# Patient Record
Sex: FEMALE | Race: WHITE
[De-identification: names, ages, dates, MRNs, and addresses within clinical notes are randomized per-mention and may not be internally consistent; named-entity substitution may affect disease eponyms.]

---

## 2020-10-10 ENCOUNTER — TRANSCRIPTION ENCOUNTER (OUTPATIENT)
Age: 35
End: 2020-10-10

## 2020-12-05 ENCOUNTER — TRANSCRIPTION ENCOUNTER (OUTPATIENT)
Age: 35
End: 2020-12-05

## 2021-02-02 ENCOUNTER — OUTPATIENT (OUTPATIENT)
Dept: OUTPATIENT SERVICES | Facility: HOSPITAL | Age: 36
LOS: 1 days | Discharge: HOME | End: 2021-02-02

## 2021-02-02 VITALS
SYSTOLIC BLOOD PRESSURE: 120 MMHG | DIASTOLIC BLOOD PRESSURE: 90 MMHG | TEMPERATURE: 98 F | RESPIRATION RATE: 18 BRPM | HEART RATE: 102 BPM

## 2021-02-02 LAB
ALBUMIN SERPL ELPH-MCNC: 3.5 G/DL — SIGNIFICANT CHANGE UP (ref 3.5–5.2)
ALP SERPL-CCNC: 92 U/L — SIGNIFICANT CHANGE UP (ref 30–115)
ALT FLD-CCNC: 16 U/L — SIGNIFICANT CHANGE UP (ref 0–41)
ANION GAP SERPL CALC-SCNC: 13 MMOL/L — SIGNIFICANT CHANGE UP (ref 7–14)
APPEARANCE UR: ABNORMAL
AST SERPL-CCNC: 18 U/L — SIGNIFICANT CHANGE UP (ref 0–41)
BACTERIA # UR AUTO: NEGATIVE — SIGNIFICANT CHANGE UP
BASOPHILS # BLD AUTO: 0.03 K/UL — SIGNIFICANT CHANGE UP (ref 0–0.2)
BASOPHILS NFR BLD AUTO: 0.2 % — SIGNIFICANT CHANGE UP (ref 0–1)
BILIRUB SERPL-MCNC: <0.2 MG/DL — SIGNIFICANT CHANGE UP (ref 0.2–1.2)
BILIRUB UR-MCNC: NEGATIVE — SIGNIFICANT CHANGE UP
BUN SERPL-MCNC: 7 MG/DL — LOW (ref 10–20)
CALCIUM SERPL-MCNC: 8.9 MG/DL — SIGNIFICANT CHANGE UP (ref 8.5–10.1)
CHLORIDE SERPL-SCNC: 101 MMOL/L — SIGNIFICANT CHANGE UP (ref 98–110)
CO2 SERPL-SCNC: 22 MMOL/L — SIGNIFICANT CHANGE UP (ref 17–32)
COLOR SPEC: YELLOW — SIGNIFICANT CHANGE UP
CREAT SERPL-MCNC: 0.6 MG/DL — LOW (ref 0.7–1.5)
DIFF PNL FLD: NEGATIVE — SIGNIFICANT CHANGE UP
EOSINOPHIL # BLD AUTO: 0.23 K/UL — SIGNIFICANT CHANGE UP (ref 0–0.7)
EOSINOPHIL NFR BLD AUTO: 1.9 % — SIGNIFICANT CHANGE UP (ref 0–8)
EPI CELLS # UR: >27 /HPF — HIGH (ref 0–5)
GLUCOSE SERPL-MCNC: 71 MG/DL — SIGNIFICANT CHANGE UP (ref 70–99)
GLUCOSE UR QL: NEGATIVE — SIGNIFICANT CHANGE UP
HCT VFR BLD CALC: 31.9 % — LOW (ref 37–47)
HGB BLD-MCNC: 10.7 G/DL — LOW (ref 12–16)
HYALINE CASTS # UR AUTO: 83 /LPF — HIGH (ref 0–7)
IMM GRANULOCYTES NFR BLD AUTO: 1.3 % — HIGH (ref 0.1–0.3)
KETONES UR-MCNC: ABNORMAL
LDH SERPL L TO P-CCNC: 159 — SIGNIFICANT CHANGE UP (ref 50–242)
LEUKOCYTE ESTERASE UR-ACNC: NEGATIVE — SIGNIFICANT CHANGE UP
LYMPHOCYTES # BLD AUTO: 16.6 % — LOW (ref 20.5–51.1)
LYMPHOCYTES # BLD AUTO: 2 K/UL — SIGNIFICANT CHANGE UP (ref 1.2–3.4)
MCHC RBC-ENTMCNC: 28.8 PG — SIGNIFICANT CHANGE UP (ref 27–31)
MCHC RBC-ENTMCNC: 33.5 G/DL — SIGNIFICANT CHANGE UP (ref 32–37)
MCV RBC AUTO: 86 FL — SIGNIFICANT CHANGE UP (ref 81–99)
MONOCYTES # BLD AUTO: 0.61 K/UL — HIGH (ref 0.1–0.6)
MONOCYTES NFR BLD AUTO: 5.1 % — SIGNIFICANT CHANGE UP (ref 1.7–9.3)
NEUTROPHILS # BLD AUTO: 8.99 K/UL — HIGH (ref 1.4–6.5)
NEUTROPHILS NFR BLD AUTO: 74.9 % — SIGNIFICANT CHANGE UP (ref 42.2–75.2)
NITRITE UR-MCNC: NEGATIVE — SIGNIFICANT CHANGE UP
NRBC # BLD: 0 /100 WBCS — SIGNIFICANT CHANGE UP (ref 0–0)
PH UR: 6 — SIGNIFICANT CHANGE UP (ref 5–8)
PLATELET # BLD AUTO: 186 K/UL — SIGNIFICANT CHANGE UP (ref 130–400)
POTASSIUM SERPL-MCNC: 3.8 MMOL/L — SIGNIFICANT CHANGE UP (ref 3.5–5)
POTASSIUM SERPL-SCNC: 3.8 MMOL/L — SIGNIFICANT CHANGE UP (ref 3.5–5)
PROT SERPL-MCNC: 6 G/DL — SIGNIFICANT CHANGE UP (ref 6–8)
PROT UR-MCNC: ABNORMAL
RBC # BLD: 3.71 M/UL — LOW (ref 4.2–5.4)
RBC # FLD: 14.1 % — SIGNIFICANT CHANGE UP (ref 11.5–14.5)
RBC CASTS # UR COMP ASSIST: 9 /HPF — HIGH (ref 0–4)
SODIUM SERPL-SCNC: 136 MMOL/L — SIGNIFICANT CHANGE UP (ref 135–146)
SP GR SPEC: 1.03 — SIGNIFICANT CHANGE UP (ref 1.01–1.03)
URATE SERPL-MCNC: 5.4 MG/DL — SIGNIFICANT CHANGE UP (ref 2.5–7)
UROBILINOGEN FLD QL: SIGNIFICANT CHANGE UP
WBC # BLD: 12.02 K/UL — HIGH (ref 4.8–10.8)
WBC # FLD AUTO: 12.02 K/UL — HIGH (ref 4.8–10.8)
WBC UR QL: 10 /HPF — HIGH (ref 0–5)

## 2021-02-02 RX ORDER — LABETALOL HCL 100 MG
100 TABLET ORAL ONCE
Refills: 0 | Status: COMPLETED | OUTPATIENT
Start: 2021-02-02 | End: 2021-02-02

## 2021-02-02 RX ADMIN — Medication 100 MILLIGRAM(S): at 23:33

## 2021-02-02 RX ADMIN — Medication 100 MILLIGRAM(S): at 22:30

## 2021-02-02 NOTE — OB PROVIDER TRIAGE NOTE - ADDITIONAL INSTRUCTIONS
If you have headache, blurry vision, chest pain, difficulty breathing, or severe abdominal pain, call the doctor or return to the hospital. Follow up with your OBGYN at next scheduled appointment on 2/3, increase dose to labetalol 200mg BID for chronic hypertension.

## 2021-02-02 NOTE — OB PROVIDER TRIAGE NOTE - NSHPPHYSICALEXAM_GEN_ALL_CORE
HR: 100 (02-02-21 @ 17:23) (100 - 100)  BP: 120/90 (02-02-21 @ 17:23) (120/90 - 120/90)    Gen: A+OX3. NAD  Heart: RRR, no M/R/G  Lungs: CTAB  Abd: Soft, Nontender. Gravid. no palpable contractions  LE: no erythema, edema, or pain  Neuro: biceps reflex 1+ bilaterally    FHR: 140bpm/moderate variability/+accelerations/no decelerations  TOCO: irregular  SVE: deferred  Sonogram: cephalic, anterior placenta, MVP: 7.61, BPP: 10/10    EFW by Leopolds: 2500

## 2021-02-02 NOTE — PROGRESS NOTE ADULT - ASSESSMENT
36yo  at 33w0d GA, GBS unknown, with cHTN on labetalol 100mg BID, r/o superimposed preeclampsia, reassuring maternal and fetal status, BPP 10/10  - severe range BPs likely secondary to missing PM dose of labetalol. Dose increased to 200mg BID, first dose given now, BPs now in elevated non-severe range.  - f/u Uprcr  - follow up with MFM at next appt on 2/3 AM  - preeclampsia precautions discussed  -  labor precautions  - discharge home    Dr. Parson and Dr. Jackson aware.

## 2021-02-02 NOTE — OB PROVIDER TRIAGE NOTE - NSOBPROVIDERNOTE_OBGYN_ALL_OB_FT
36yo  at 33w0d GA, GBS unknown, with chronic htn, with elevated blood pressures in the office, BPP: 10/10  -continuous EFM and toco  -vitals [id number] and labs  -IV access  -re evaluate    Dr. Walsh aware, Dr. Jackson to be made aware 36yo  at 33w0d GA, GBS unknown, with chronic htn, with elevated blood pressures in the office, BPP: 10/10  -continuous EFM and toco  -vitals [id number] and labs  -IV access  -re evaluate    Dr. Walsh aware, Dr. Jackson to be made aware    ADDENDUM: See progress note

## 2021-02-02 NOTE — OB PROVIDER TRIAGE NOTE - HISTORY OF PRESENT ILLNESS
32yo  at 33w0d GA, EDC 3/23/21 by 1st trimester sonogram for prolonged monitoring. Patient was seen in the office today and had elevated blood pressures in the 140's/90's 3 times. Patient has a history of chronic hypertension patient states her normal blood pressures are in the 130's/80's. Patient denies headache, vision changes, chest pain, SOB, RUQ or epigastric pain, LE edema. Denies ctx, LOF, VB. Good fetal movement. Last meal 11Am, Last BM 10Am, Last sex months ago. GBS unknown

## 2021-02-03 VITALS — DIASTOLIC BLOOD PRESSURE: 58 MMHG | SYSTOLIC BLOOD PRESSURE: 106 MMHG | HEART RATE: 90 BPM

## 2021-02-03 LAB
CREAT ?TM UR-MCNC: 231 MG/DL — SIGNIFICANT CHANGE UP
PROT ?TM UR-MCNC: 18 MG/DLG/24H — SIGNIFICANT CHANGE UP
PROT/CREAT UR-RTO: 0.1 RATIO — SIGNIFICANT CHANGE UP (ref 0–0.2)

## 2021-05-14 NOTE — OB RN TRIAGE NOTE - PRO INTERPRETER NEED 2
Bedside shift change report given to SAMMY Rosales LPN (oncoming nurse) by [employer]. RYAN Mendiola (offgoing nurse). Report included the following information SBAR, Kardex and MAR. English

## 2022-01-17 ENCOUNTER — INPATIENT (INPATIENT)
Facility: HOSPITAL | Age: 37
LOS: 2 days | Discharge: HOME | End: 2022-01-20
Attending: SURGERY | Admitting: SURGERY
Payer: COMMERCIAL

## 2022-01-17 VITALS
HEART RATE: 77 BPM | HEIGHT: 62 IN | DIASTOLIC BLOOD PRESSURE: 96 MMHG | SYSTOLIC BLOOD PRESSURE: 213 MMHG | OXYGEN SATURATION: 99 % | RESPIRATION RATE: 18 BRPM | WEIGHT: 169.98 LBS | TEMPERATURE: 98 F

## 2022-01-17 DIAGNOSIS — Z98.891 HISTORY OF UTERINE SCAR FROM PREVIOUS SURGERY: Chronic | ICD-10-CM

## 2022-01-17 PROBLEM — D64.9 ANEMIA, UNSPECIFIED: Chronic | Status: ACTIVE | Noted: 2021-02-02

## 2022-01-17 PROBLEM — I10 ESSENTIAL (PRIMARY) HYPERTENSION: Chronic | Status: ACTIVE | Noted: 2021-02-02

## 2022-01-17 LAB
ALBUMIN SERPL ELPH-MCNC: 3.7 G/DL — SIGNIFICANT CHANGE UP (ref 3.5–5.2)
ALBUMIN SERPL ELPH-MCNC: 4.8 G/DL — SIGNIFICANT CHANGE UP (ref 3.5–5.2)
ALP SERPL-CCNC: 67 U/L — SIGNIFICANT CHANGE UP (ref 30–115)
ALP SERPL-CCNC: 80 U/L — SIGNIFICANT CHANGE UP (ref 30–115)
ALT FLD-CCNC: 12 U/L — SIGNIFICANT CHANGE UP (ref 0–41)
ALT FLD-CCNC: 18 U/L — SIGNIFICANT CHANGE UP (ref 0–41)
ANION GAP SERPL CALC-SCNC: 13 MMOL/L — SIGNIFICANT CHANGE UP (ref 7–14)
ANION GAP SERPL CALC-SCNC: 18 MMOL/L — HIGH (ref 7–14)
APTT BLD: 32 SEC — SIGNIFICANT CHANGE UP (ref 27–39.2)
AST SERPL-CCNC: 16 U/L — SIGNIFICANT CHANGE UP (ref 0–41)
AST SERPL-CCNC: 22 U/L — SIGNIFICANT CHANGE UP (ref 0–41)
BASOPHILS # BLD AUTO: 0.02 K/UL — SIGNIFICANT CHANGE UP (ref 0–0.2)
BASOPHILS # BLD AUTO: 0.04 K/UL — SIGNIFICANT CHANGE UP (ref 0–0.2)
BASOPHILS NFR BLD AUTO: 0.2 % — SIGNIFICANT CHANGE UP (ref 0–1)
BASOPHILS NFR BLD AUTO: 0.4 % — SIGNIFICANT CHANGE UP (ref 0–1)
BILIRUB DIRECT SERPL-MCNC: <0.2 MG/DL — SIGNIFICANT CHANGE UP (ref 0–0.3)
BILIRUB DIRECT SERPL-MCNC: <0.2 MG/DL — SIGNIFICANT CHANGE UP (ref 0–0.3)
BILIRUB INDIRECT FLD-MCNC: >0.1 MG/DL — LOW (ref 0.2–1.2)
BILIRUB INDIRECT FLD-MCNC: >0.5 MG/DL — SIGNIFICANT CHANGE UP (ref 0.2–1.2)
BILIRUB SERPL-MCNC: 0.3 MG/DL — SIGNIFICANT CHANGE UP (ref 0.2–1.2)
BILIRUB SERPL-MCNC: 0.7 MG/DL — SIGNIFICANT CHANGE UP (ref 0.2–1.2)
BLD GP AB SCN SERPL QL: SIGNIFICANT CHANGE UP
BLD GP AB SCN SERPL QL: SIGNIFICANT CHANGE UP
BUN SERPL-MCNC: 11 MG/DL — SIGNIFICANT CHANGE UP (ref 10–20)
BUN SERPL-MCNC: 8 MG/DL — LOW (ref 10–20)
CALCIUM SERPL-MCNC: 7.8 MG/DL — LOW (ref 8.5–10.1)
CALCIUM SERPL-MCNC: 9.7 MG/DL — SIGNIFICANT CHANGE UP (ref 8.5–10.1)
CHLORIDE SERPL-SCNC: 103 MMOL/L — SIGNIFICANT CHANGE UP (ref 98–110)
CHLORIDE SERPL-SCNC: 105 MMOL/L — SIGNIFICANT CHANGE UP (ref 98–110)
CO2 SERPL-SCNC: 20 MMOL/L — SIGNIFICANT CHANGE UP (ref 17–32)
CO2 SERPL-SCNC: 21 MMOL/L — SIGNIFICANT CHANGE UP (ref 17–32)
CREAT SERPL-MCNC: 0.8 MG/DL — SIGNIFICANT CHANGE UP (ref 0.7–1.5)
CREAT SERPL-MCNC: 0.8 MG/DL — SIGNIFICANT CHANGE UP (ref 0.7–1.5)
EOSINOPHIL # BLD AUTO: 0.08 K/UL — SIGNIFICANT CHANGE UP (ref 0–0.7)
EOSINOPHIL # BLD AUTO: 0.22 K/UL — SIGNIFICANT CHANGE UP (ref 0–0.7)
EOSINOPHIL NFR BLD AUTO: 0.8 % — SIGNIFICANT CHANGE UP (ref 0–8)
EOSINOPHIL NFR BLD AUTO: 2.4 % — SIGNIFICANT CHANGE UP (ref 0–8)
GLUCOSE SERPL-MCNC: 143 MG/DL — HIGH (ref 70–99)
GLUCOSE SERPL-MCNC: 95 MG/DL — SIGNIFICANT CHANGE UP (ref 70–99)
HCG SERPL QL: NEGATIVE — SIGNIFICANT CHANGE UP
HCT VFR BLD CALC: 33.1 % — LOW (ref 37–47)
HCT VFR BLD CALC: 36.3 % — LOW (ref 37–47)
HGB BLD-MCNC: 10.9 G/DL — LOW (ref 12–16)
HGB BLD-MCNC: 12.2 G/DL — SIGNIFICANT CHANGE UP (ref 12–16)
IMM GRANULOCYTES NFR BLD AUTO: 0.3 % — SIGNIFICANT CHANGE UP (ref 0.1–0.3)
IMM GRANULOCYTES NFR BLD AUTO: 0.3 % — SIGNIFICANT CHANGE UP (ref 0.1–0.3)
INR BLD: 1.09 RATIO — SIGNIFICANT CHANGE UP (ref 0.65–1.3)
LACTATE SERPL-SCNC: 1.4 MMOL/L — SIGNIFICANT CHANGE UP (ref 0.7–2)
LIDOCAIN IGE QN: 20 U/L — SIGNIFICANT CHANGE UP (ref 7–60)
LYMPHOCYTES # BLD AUTO: 1.21 K/UL — SIGNIFICANT CHANGE UP (ref 1.2–3.4)
LYMPHOCYTES # BLD AUTO: 1.61 K/UL — SIGNIFICANT CHANGE UP (ref 1.2–3.4)
LYMPHOCYTES # BLD AUTO: 12.2 % — LOW (ref 20.5–51.1)
LYMPHOCYTES # BLD AUTO: 17.7 % — LOW (ref 20.5–51.1)
MAGNESIUM SERPL-MCNC: 1.7 MG/DL — LOW (ref 1.8–2.4)
MCHC RBC-ENTMCNC: 29.6 PG — SIGNIFICANT CHANGE UP (ref 27–31)
MCHC RBC-ENTMCNC: 29.8 PG — SIGNIFICANT CHANGE UP (ref 27–31)
MCHC RBC-ENTMCNC: 32.9 G/DL — SIGNIFICANT CHANGE UP (ref 32–37)
MCHC RBC-ENTMCNC: 33.6 G/DL — SIGNIFICANT CHANGE UP (ref 32–37)
MCV RBC AUTO: 88.1 FL — SIGNIFICANT CHANGE UP (ref 81–99)
MCV RBC AUTO: 90.4 FL — SIGNIFICANT CHANGE UP (ref 81–99)
MONOCYTES # BLD AUTO: 0.31 K/UL — SIGNIFICANT CHANGE UP (ref 0.1–0.6)
MONOCYTES # BLD AUTO: 0.61 K/UL — HIGH (ref 0.1–0.6)
MONOCYTES NFR BLD AUTO: 3.1 % — SIGNIFICANT CHANGE UP (ref 1.7–9.3)
MONOCYTES NFR BLD AUTO: 6.7 % — SIGNIFICANT CHANGE UP (ref 1.7–9.3)
NEUTROPHILS # BLD AUTO: 6.62 K/UL — HIGH (ref 1.4–6.5)
NEUTROPHILS # BLD AUTO: 8.24 K/UL — HIGH (ref 1.4–6.5)
NEUTROPHILS NFR BLD AUTO: 72.7 % — SIGNIFICANT CHANGE UP (ref 42.2–75.2)
NEUTROPHILS NFR BLD AUTO: 83.2 % — HIGH (ref 42.2–75.2)
NRBC # BLD: 0 /100 WBCS — SIGNIFICANT CHANGE UP (ref 0–0)
NRBC # BLD: 0 /100 WBCS — SIGNIFICANT CHANGE UP (ref 0–0)
PHOSPHATE SERPL-MCNC: 2.5 MG/DL — SIGNIFICANT CHANGE UP (ref 2.1–4.9)
PLATELET # BLD AUTO: 183 K/UL — SIGNIFICANT CHANGE UP (ref 130–400)
PLATELET # BLD AUTO: 245 K/UL — SIGNIFICANT CHANGE UP (ref 130–400)
POTASSIUM SERPL-MCNC: 3.6 MMOL/L — SIGNIFICANT CHANGE UP (ref 3.5–5)
POTASSIUM SERPL-MCNC: 4.1 MMOL/L — SIGNIFICANT CHANGE UP (ref 3.5–5)
POTASSIUM SERPL-SCNC: 3.6 MMOL/L — SIGNIFICANT CHANGE UP (ref 3.5–5)
POTASSIUM SERPL-SCNC: 4.1 MMOL/L — SIGNIFICANT CHANGE UP (ref 3.5–5)
PROT SERPL-MCNC: 5.8 G/DL — LOW (ref 6–8)
PROT SERPL-MCNC: 7.6 G/DL — SIGNIFICANT CHANGE UP (ref 6–8)
PROTHROM AB SERPL-ACNC: 12.5 SEC — SIGNIFICANT CHANGE UP (ref 9.95–12.87)
RBC # BLD: 3.66 M/UL — LOW (ref 4.2–5.4)
RBC # BLD: 4.12 M/UL — LOW (ref 4.2–5.4)
RBC # FLD: 12.4 % — SIGNIFICANT CHANGE UP (ref 11.5–14.5)
RBC # FLD: 12.8 % — SIGNIFICANT CHANGE UP (ref 11.5–14.5)
SARS-COV-2 RNA SPEC QL NAA+PROBE: SIGNIFICANT CHANGE UP
SODIUM SERPL-SCNC: 139 MMOL/L — SIGNIFICANT CHANGE UP (ref 135–146)
SODIUM SERPL-SCNC: 141 MMOL/L — SIGNIFICANT CHANGE UP (ref 135–146)
TROPONIN T SERPL-MCNC: <0.01 NG/ML — SIGNIFICANT CHANGE UP
WBC # BLD: 9.11 K/UL — SIGNIFICANT CHANGE UP (ref 4.8–10.8)
WBC # BLD: 9.91 K/UL — SIGNIFICANT CHANGE UP (ref 4.8–10.8)
WBC # FLD AUTO: 9.11 K/UL — SIGNIFICANT CHANGE UP (ref 4.8–10.8)
WBC # FLD AUTO: 9.91 K/UL — SIGNIFICANT CHANGE UP (ref 4.8–10.8)

## 2022-01-17 PROCEDURE — 99223 1ST HOSP IP/OBS HIGH 75: CPT

## 2022-01-17 PROCEDURE — 99222 1ST HOSP IP/OBS MODERATE 55: CPT

## 2022-01-17 PROCEDURE — 71045 X-RAY EXAM CHEST 1 VIEW: CPT | Mod: 26

## 2022-01-17 PROCEDURE — 76705 ECHO EXAM OF ABDOMEN: CPT | Mod: 26

## 2022-01-17 PROCEDURE — 99285 EMERGENCY DEPT VISIT HI MDM: CPT

## 2022-01-17 PROCEDURE — 93010 ELECTROCARDIOGRAM REPORT: CPT

## 2022-01-17 RX ORDER — OXYCODONE HYDROCHLORIDE 5 MG/1
5 TABLET ORAL EVERY 6 HOURS
Refills: 0 | Status: DISCONTINUED | OUTPATIENT
Start: 2022-01-17 | End: 2022-01-19

## 2022-01-17 RX ORDER — MAGNESIUM SULFATE 500 MG/ML
2 VIAL (ML) INJECTION ONCE
Refills: 0 | Status: COMPLETED | OUTPATIENT
Start: 2022-01-17 | End: 2022-01-18

## 2022-01-17 RX ORDER — POTASSIUM PHOSPHATE, MONOBASIC POTASSIUM PHOSPHATE, DIBASIC 236; 224 MG/ML; MG/ML
15 INJECTION, SOLUTION INTRAVENOUS ONCE
Refills: 0 | Status: COMPLETED | OUTPATIENT
Start: 2022-01-17 | End: 2022-01-18

## 2022-01-17 RX ORDER — MORPHINE SULFATE 50 MG/1
4 CAPSULE, EXTENDED RELEASE ORAL ONCE
Refills: 0 | Status: DISCONTINUED | OUTPATIENT
Start: 2022-01-17 | End: 2022-01-17

## 2022-01-17 RX ORDER — PANTOPRAZOLE SODIUM 20 MG/1
40 TABLET, DELAYED RELEASE ORAL
Refills: 0 | Status: DISCONTINUED | OUTPATIENT
Start: 2022-01-17 | End: 2022-01-19

## 2022-01-17 RX ORDER — PANTOPRAZOLE SODIUM 20 MG/1
40 TABLET, DELAYED RELEASE ORAL
Refills: 0 | Status: DISCONTINUED | OUTPATIENT
Start: 2022-01-17 | End: 2022-01-17

## 2022-01-17 RX ORDER — IBUPROFEN 200 MG
400 TABLET ORAL EVERY 8 HOURS
Refills: 0 | Status: DISCONTINUED | OUTPATIENT
Start: 2022-01-17 | End: 2022-01-17

## 2022-01-17 RX ORDER — SODIUM CHLORIDE 9 MG/ML
1000 INJECTION, SOLUTION INTRAVENOUS ONCE
Refills: 0 | Status: COMPLETED | OUTPATIENT
Start: 2022-01-17 | End: 2022-01-17

## 2022-01-17 RX ORDER — SODIUM CHLORIDE 9 MG/ML
1000 INJECTION INTRAMUSCULAR; INTRAVENOUS; SUBCUTANEOUS
Refills: 0 | Status: DISCONTINUED | OUTPATIENT
Start: 2022-01-17 | End: 2022-01-19

## 2022-01-17 RX ORDER — ONDANSETRON 8 MG/1
4 TABLET, FILM COATED ORAL ONCE
Refills: 0 | Status: COMPLETED | OUTPATIENT
Start: 2022-01-17 | End: 2022-01-17

## 2022-01-17 RX ORDER — ACETAMINOPHEN 500 MG
650 TABLET ORAL EVERY 6 HOURS
Refills: 0 | Status: DISCONTINUED | OUTPATIENT
Start: 2022-01-17 | End: 2022-01-19

## 2022-01-17 RX ORDER — CHLORHEXIDINE GLUCONATE 213 G/1000ML
1 SOLUTION TOPICAL DAILY
Refills: 0 | Status: DISCONTINUED | OUTPATIENT
Start: 2022-01-17 | End: 2022-01-19

## 2022-01-17 RX ORDER — LABETALOL HCL 100 MG
200 TABLET ORAL
Refills: 0 | Status: DISCONTINUED | OUTPATIENT
Start: 2022-01-17 | End: 2022-01-19

## 2022-01-17 RX ORDER — HYDROMORPHONE HYDROCHLORIDE 2 MG/ML
1 INJECTION INTRAMUSCULAR; INTRAVENOUS; SUBCUTANEOUS ONCE
Refills: 0 | Status: DISCONTINUED | OUTPATIENT
Start: 2022-01-17 | End: 2022-01-17

## 2022-01-17 RX ORDER — FAMOTIDINE 10 MG/ML
20 INJECTION INTRAVENOUS ONCE
Refills: 0 | Status: COMPLETED | OUTPATIENT
Start: 2022-01-17 | End: 2022-01-17

## 2022-01-17 RX ORDER — HEPARIN SODIUM 5000 [USP'U]/ML
5000 INJECTION INTRAVENOUS; SUBCUTANEOUS EVERY 8 HOURS
Refills: 0 | Status: DISCONTINUED | OUTPATIENT
Start: 2022-01-17 | End: 2022-01-19

## 2022-01-17 RX ADMIN — MORPHINE SULFATE 4 MILLIGRAM(S): 50 CAPSULE, EXTENDED RELEASE ORAL at 03:40

## 2022-01-17 RX ADMIN — Medication 650 MILLIGRAM(S): at 17:39

## 2022-01-17 RX ADMIN — Medication 400 MILLIGRAM(S): at 06:46

## 2022-01-17 RX ADMIN — Medication 400 MILLIGRAM(S): at 07:06

## 2022-01-17 RX ADMIN — Medication 650 MILLIGRAM(S): at 11:19

## 2022-01-17 RX ADMIN — Medication 650 MILLIGRAM(S): at 06:46

## 2022-01-17 RX ADMIN — HEPARIN SODIUM 5000 UNIT(S): 5000 INJECTION INTRAVENOUS; SUBCUTANEOUS at 23:28

## 2022-01-17 RX ADMIN — ONDANSETRON 4 MILLIGRAM(S): 8 TABLET, FILM COATED ORAL at 05:03

## 2022-01-17 RX ADMIN — Medication 200 MILLIGRAM(S): at 17:41

## 2022-01-17 RX ADMIN — SODIUM CHLORIDE 1000 MILLILITER(S): 9 INJECTION, SOLUTION INTRAVENOUS at 03:10

## 2022-01-17 RX ADMIN — Medication 650 MILLIGRAM(S): at 16:35

## 2022-01-17 RX ADMIN — HEPARIN SODIUM 5000 UNIT(S): 5000 INJECTION INTRAVENOUS; SUBCUTANEOUS at 15:37

## 2022-01-17 RX ADMIN — ONDANSETRON 4 MILLIGRAM(S): 8 TABLET, FILM COATED ORAL at 03:02

## 2022-01-17 RX ADMIN — HYDROMORPHONE HYDROCHLORIDE 1 MILLIGRAM(S): 2 INJECTION INTRAMUSCULAR; INTRAVENOUS; SUBCUTANEOUS at 04:29

## 2022-01-17 RX ADMIN — PANTOPRAZOLE SODIUM 40 MILLIGRAM(S): 20 TABLET, DELAYED RELEASE ORAL at 17:39

## 2022-01-17 RX ADMIN — HYDROMORPHONE HYDROCHLORIDE 1 MILLIGRAM(S): 2 INJECTION INTRAMUSCULAR; INTRAVENOUS; SUBCUTANEOUS at 05:00

## 2022-01-17 RX ADMIN — Medication 650 MILLIGRAM(S): at 07:06

## 2022-01-17 RX ADMIN — MORPHINE SULFATE 4 MILLIGRAM(S): 50 CAPSULE, EXTENDED RELEASE ORAL at 03:02

## 2022-01-17 RX ADMIN — MORPHINE SULFATE 4 MILLIGRAM(S): 50 CAPSULE, EXTENDED RELEASE ORAL at 03:45

## 2022-01-17 RX ADMIN — FAMOTIDINE 104 MILLIGRAM(S): 10 INJECTION INTRAVENOUS at 03:40

## 2022-01-17 RX ADMIN — SODIUM CHLORIDE 125 MILLILITER(S): 9 INJECTION INTRAMUSCULAR; INTRAVENOUS; SUBCUTANEOUS at 11:19

## 2022-01-17 RX ADMIN — SODIUM CHLORIDE 125 MILLILITER(S): 9 INJECTION INTRAMUSCULAR; INTRAVENOUS; SUBCUTANEOUS at 17:39

## 2022-01-17 RX ADMIN — Medication 650 MILLIGRAM(S): at 23:28

## 2022-01-17 RX ADMIN — MORPHINE SULFATE 4 MILLIGRAM(S): 50 CAPSULE, EXTENDED RELEASE ORAL at 04:15

## 2022-01-17 RX ADMIN — Medication 650 MILLIGRAM(S): at 18:55

## 2022-01-17 RX ADMIN — Medication 200 MILLIGRAM(S): at 06:46

## 2022-01-17 NOTE — ED PROVIDER NOTE - ATTENDING CONTRIBUTION TO CARE
Patient is c/o abdominal pain with nausea/vomiting, denies cp/sob/f/c; trauma. No rash.   Vitals reviewed.   Patient is awake, alert, answering questions appropriately, appears uncomfortable, but not in distress.  Lungs: CTA, no wheezing, no crackles.  Abd: +BS, +epigastric tenderness, ND, soft, no rebound, no guarding. No CVA tenderness, no rash.  CNS: awake, alert, o x 3, no focal neurologic deficits.  A/P: Abdominal pain/vomiting,   labs, symptomatic treatment,   US, reevaluation.

## 2022-01-17 NOTE — PATIENT PROFILE ADULT - FALL HARM RISK - HARM RISK INTERVENTIONS

## 2022-01-17 NOTE — H&P ADULT - HISTORY OF PRESENT ILLNESS
35 y/o female with PMHx of HTN, Anemia presents to ED c/o abdominal pain. States that the pain started last night at 7pm after she ate greasy food. Pain is located over the RUQ, radiates to the back, associated with nausea and multiple episodes of vomiting.  She states that she had similar episodes in the past after eating large meals and fatty food. Denies any fever or chills

## 2022-01-17 NOTE — PATIENT PROFILE ADULT - MONEY FOR FOOD
ED Nurse Note: Pt brought in by son from home c/o redness, pain, and swelling 
to right lower leg x 1 month. Respirations even and unlabored on room air. 
Vitals stable as documented. Denies history of HTN or DM. Afebrile. A+ox4, 
speaking in full sentences. no

## 2022-01-17 NOTE — ED ADULT NURSE NOTE - NS_SISCREENINGSR_GEN_ALL_ED
X Size Of Lesion In Cm (Optional): 0
Detail Level: Detailed
Anatomic Location From Referring Provider: Left pointer
Anatomic Location From Referring Provider: Left pinky
Negative

## 2022-01-17 NOTE — H&P ADULT - NSHPLABSRESULTS_GEN_ALL_CORE
LABS  CBC (01-17 @ 03:03)                              12.2                           9.91    )----------------(  245        83.2<H>% Neutrophils, 12.2<L>% Lymphocytes, ANC: 8.24<H>                              36.3<L>    BMP (01-17 @ 03:03)             141     |  103     |  11    		Ca++ --      Ca 9.7                ---------------------------------( 143<H>		Mg --                 4.1     |  20      |  0.8   			Ph --        LFTs (01-17 @ 03:03)      TPro 7.6 / Alb 4.8 / TBili 0.3 / DBili <0.2 / AST 22 / ALT 18 / AlkPhos 80    Coags (01-17 @ 03:03)  aPTT 32.0 / INR 1.09 / PT 12.50      ABG (01-17 @ 03:03)      /  /  /  /  / %     Lactate:  1.4      --------------------------------------------------------------------------------------------    IMAGING:   < from: US Abdomen Upper Quadrant Right (01.17.22 @ 04:19) >    IMPRESSION:    Cholelithiasis without evidence for cholecystitis  0.5 cm right lower pole renal calculus. No hydronephrosis.  2.8 cm right hepatic lobe hyperechoic focus may reflect a hemangioma.    < end of copied text >

## 2022-01-17 NOTE — CONSULT NOTE ADULT - ATTENDING COMMENTS
35 yo F with Biliary colic low likelihood for choledocholithiasis. Surgical follow up for elective lap bernie.

## 2022-01-17 NOTE — PATIENT PROFILE ADULT - FUNCTIONAL ASSESSMENT - BASIC MOBILITY 4.
Instructions: This plan will send the code FBSE to the PM system.  DO NOT or CHANGE the price. Price (Do Not Change): 0.00 Detail Level: Simple 4 = No assist / stand by assistance

## 2022-01-17 NOTE — ED PROVIDER NOTE - OBJECTIVE STATEMENT
Pt is a 37 y/o female with PMHx of HTN, Anemia presents to ED c/o RUQ abdominal pain associated with nausea and multiple episodes of vomiting.  She states that she had similar episodes in the past after eating large meals and fatty food. .

## 2022-01-17 NOTE — H&P ADULT - NSHPPHYSICALEXAM_GEN_ALL_CORE
Vitals:   T(C): 36.4 (01-17-22 @ 02:35), Max: 36.4 (01-17-22 @ 02:35)  HR: 77 (01-17-22 @ 02:35) (77 - 77)  BP: 213/96 (01-17-22 @ 02:35) (213/96 - 213/96)  RR: 18 (01-17-22 @ 02:35) (18 - 18)  SpO2: 99% (01-17-22 @ 02:35) (99% - 99%)    PHYSICAL EXAM:  General: NAD, AAOx3,   Cardiac: RRR S1, S2,   Respiratory: CTAB,   Abdomen: Soft, non-distended, mild RUQ tenderness, no rebound or guarding, -pearson sign  Extremities: well perfused

## 2022-01-17 NOTE — ED ADULT TRIAGE NOTE - CHIEF COMPLAINT QUOTE
Patient complaining of epigastric /abdominal pain. Patient with gallbladder attacks over the last month and worsening tonight. Pain is associated with nausea and vomiting.

## 2022-01-17 NOTE — CONSULT NOTE ADULT - SUBJECTIVE AND OBJECTIVE BOX
Patient is a 37 y/o female with PMHx of HTN, Anemia , recent pregnancy who presents to [facility] with c/o abdominal pain. Patient notes she has been having pains like this for the past year now. She notes approximately five episodes each of which occurred after larger meals. She notes pain has never been this severe as the pain she had yesterday. Patient notes when pain occurred it was located in the Epigastric area with radiation thru to the back. Pain was sharp and stabbing, occurring after a fatty meal. Patient had no alleviating factors at the time. She notes some NSAID use post [id number] and has been under some stress lately as has a new born at home. She has not had EGD. Pain much better since admission.       PAST MEDICAL & SURGICAL HISTORY:  Essential hypertension  Anemia  H/O:       Family Hx:  Father: Non Contributory   Mother: Non Contributor      Social History  Denies Current Tobacco use  Denies Current ETOH use  Denies Current Illicit Drug use       MEDICATIONS  (STANDING):  acetaminophen     Tablet .. 650 milliGRAM(s) Oral every 6 hours  chlorhexidine 4% Liquid 1 Application(s) Topical daily  heparin   Injectable 5000 Unit(s) SubCutaneous every 8 hours  ibuprofen  Tablet. 400 milliGRAM(s) Oral every 8 hours  labetalol 200 milliGRAM(s) Oral two times a day  pantoprazole    Tablet 40 milliGRAM(s) Oral before breakfast  sodium chloride 0.9%. 1000 milliLiter(s) (125 mL/Hr) IV Continuous <Continuous>    MEDICATIONS  (PRN):  oxyCODONE    IR 5 milliGRAM(s) Oral every 6 hours PRN Moderate Pain (4 - 6)      Allergies  No Known Allergies      Review of Systems  General:  Denies Fatigue, Denies Fever, Denies Weakness ,Denies Weight Loss   HEENT: Denies Trouble Swallowing ,Denies  Sore Throat , Denies Change in hearing/vision/speech ,Denies Dizziness    Cardio: Denies  Chest Pain , Palpitations    Respiratory: Denies worsening of SOB, Denies Cough  Abdomen: See detailed HPI  Neuro: Denies Headache Denies Dizziness, Denies Paresthesias  MSK: Denies pain in Bones/Joints/Muscles   Psych: Patient denies depression, denies suicidal or homicidal ideations  Integ: Patient Denies rash, or new skin lesions       Vital Signs  T(F): 97.8 (2022 10:30), Max: 98.9 (2022 07:41)  HR: 79 (2022 10:30) (72 - 79)  BP: 134/81 (2022 10:30) (134/81 - 213/96)  RR: 18 (2022 10:30) (18 - 20)  SpO2: 97% (2022 10:30) (97% - 100%)  Physical Exam  Gen: NAD  HEENT: NC/AT, Mucosal Membranes  Cardio: S1/S2 No S3/S4, Regular  Resp: CTA B/L  Abdomen: Soft, ND/NT  Neuro: AAOx3, Cranial Nerve II-XII intact   Extremities: FROM x 4  Skin: no jaundice         Labs:                            12.2   9.91  )-----------( 245      ( 2022 03:03 )             36.3       Auto Neutrophil %: 83.2 % (22 @ 03:03)  Auto Immature Granulocyte %: 0.3 % (22 @ 03:03)        141  |  103  |  11  ----------------------------<  143<H>  4.1   |  20  |  0.8      Calcium, Total Serum: 9.7 mg/dL (22 @ 03:03)      LFTs:             7.6  | 0.3  | 22       ------------------[80      ( 2022 03:03 )  4.8  | <0.2 | 18          Lipase:20       Lactate, Blood: 1.4 mmol/L (22 @ 03:03)      Coags:     12.50  ----< 1.09    ( 2022 03:03 )     32.0        CARDIAC MARKERS ( 2022 03:19 )  x     / <0.01 ng/mL / x     / x     / x          RADIOLOGY & ADDITIONAL STUDIES:  US Abdomen Upper Quadrant Right 22   IMPRESSION:      Cholelithiasis without evidence for cholecystitis  0.5 cm right lower pole renal calculus. No hydronephrosis.  2.8 cm right hepatic lobe hyperechoic focus may reflect a hemangioma.

## 2022-01-17 NOTE — CONSULT NOTE ADULT - ASSESSMENT
Patient is a 37 y/o female with PMHx of HTN, Anemia , recent pregnancy who presents to [facility] with c/o abdominal pain. Patient notes she has been having pains like this for the past year now. She notes approximately five episodes each of which occurred after larger meals. She notes pain has never been this severe as the pain she had yesterday. Patient notes when pain occurred it was located in the Epigastric area with radiation thru to the back. Pain was sharp and stabbing, occurring after a fatty meal. Patient had no alleviating factors at the time. She notes some NSAID use post C-a7powds and has been under some stress lately as has a new born at home. She has not had EGD. Pain much better since admission. Imaging and labs reassuring that there is no Biliary obstruction ( CBD 3mm, LFt normal). Pain seems to be Biliary colic upon interview with no evidence of Cholecystitis currently. Patient may be going for CCY this Wednesday. Surgery wants to rule out ulcer.     Biliary Colic/ NSAID use  - Patient NPO  - LFT reassuring  - CBD 3 mm  - Continue Pantoprazole 40mg BID  - Possible EGD as requested prior to surgery  - Will follow

## 2022-01-17 NOTE — H&P ADULT - ATTENDING COMMENTS
patient seen. abdomainl pain, suspicious for biliary colic. will admit for possible cholecystectomy, will ask GI to see for possible EGD.

## 2022-01-17 NOTE — H&P ADULT - ASSESSMENT
ASSESSMENT:37 y/o female with PMHx of HTN, Anemia presents to ED c/o RUQ abdominal pain associated with nausea and multiple episodes of vomiting.  She states that she had similar episodes in the past after eating large meals and fatty food.   WBC 9.9, US of the abdomen shows Cholelithiasis without evidence for cholecystitis    Plan:   patient with recurrent biliary colic  NPO, IVF  possible OR for laparoscopic cholecystectomy on this admission   pain control  incentive spirometry  DVT/GI prophylaxis  SCDs, IS  encourage ambulation

## 2022-01-17 NOTE — ED PROVIDER NOTE - PHYSICAL EXAMINATION
CONSTITUTIONAL: Well-developed; well-nourished; in no acute distress.   SKIN: warm, dry  HEAD: Normocephalic; atraumatic.  EYES: PERRL, EOMI, normal sclera and conjunctiva   ENT: No nasal discharge; airway clear.  NECK: Supple; non tender.  CARD:  Regular rate and rhythm.   RESP: NO inc WOB , speaking in full sentences, lungs CTAB  ABD: TTP in RUQ   EXT: Normal ROM.  no LE edema no unilateral leg swelling  NEURO: Alert, oriented, grossly unremarkable  PSYCH: Cooperative, appropriate.

## 2022-01-17 NOTE — ED PROVIDER NOTE - CLINICAL SUMMARY MEDICAL DECISION MAKING FREE TEXT BOX
Patient presented to ED for, labs/imaging findings reviewed and discussed with patient. Surgery on call consulted. Patient was evaluated by surgery, as recommended, is admitted to surgery for further evaluation and care.

## 2022-01-18 LAB
ALBUMIN SERPL ELPH-MCNC: 3.5 G/DL — SIGNIFICANT CHANGE UP (ref 3.5–5.2)
ALP SERPL-CCNC: 63 U/L — SIGNIFICANT CHANGE UP (ref 30–115)
ALT FLD-CCNC: 11 U/L — SIGNIFICANT CHANGE UP (ref 0–41)
ANION GAP SERPL CALC-SCNC: 11 MMOL/L — SIGNIFICANT CHANGE UP (ref 7–14)
APTT BLD: 36.8 SEC — SIGNIFICANT CHANGE UP (ref 27–39.2)
AST SERPL-CCNC: 14 U/L — SIGNIFICANT CHANGE UP (ref 0–41)
BASOPHILS # BLD AUTO: 0.02 K/UL — SIGNIFICANT CHANGE UP (ref 0–0.2)
BASOPHILS NFR BLD AUTO: 0.3 % — SIGNIFICANT CHANGE UP (ref 0–1)
BILIRUB DIRECT SERPL-MCNC: <0.2 MG/DL — SIGNIFICANT CHANGE UP (ref 0–0.3)
BILIRUB INDIRECT FLD-MCNC: >0 MG/DL — LOW (ref 0.2–1.2)
BILIRUB SERPL-MCNC: 0.2 MG/DL — SIGNIFICANT CHANGE UP (ref 0.2–1.2)
BUN SERPL-MCNC: 5 MG/DL — LOW (ref 10–20)
CALCIUM SERPL-MCNC: 7.9 MG/DL — LOW (ref 8.5–10.1)
CHLORIDE SERPL-SCNC: 108 MMOL/L — SIGNIFICANT CHANGE UP (ref 98–110)
CO2 SERPL-SCNC: 20 MMOL/L — SIGNIFICANT CHANGE UP (ref 17–32)
CREAT SERPL-MCNC: 0.6 MG/DL — LOW (ref 0.7–1.5)
EOSINOPHIL # BLD AUTO: 0.26 K/UL — SIGNIFICANT CHANGE UP (ref 0–0.7)
EOSINOPHIL NFR BLD AUTO: 3.6 % — SIGNIFICANT CHANGE UP (ref 0–8)
GLUCOSE SERPL-MCNC: 88 MG/DL — SIGNIFICANT CHANGE UP (ref 70–99)
HCT VFR BLD CALC: 31.6 % — LOW (ref 37–47)
HGB BLD-MCNC: 10.1 G/DL — LOW (ref 12–16)
IMM GRANULOCYTES NFR BLD AUTO: 0.1 % — SIGNIFICANT CHANGE UP (ref 0.1–0.3)
INR BLD: 1.2 RATIO — SIGNIFICANT CHANGE UP (ref 0.65–1.3)
LYMPHOCYTES # BLD AUTO: 1.85 K/UL — SIGNIFICANT CHANGE UP (ref 1.2–3.4)
LYMPHOCYTES # BLD AUTO: 25.9 % — SIGNIFICANT CHANGE UP (ref 20.5–51.1)
MAGNESIUM SERPL-MCNC: 2.2 MG/DL — SIGNIFICANT CHANGE UP (ref 1.8–2.4)
MCHC RBC-ENTMCNC: 29.4 PG — SIGNIFICANT CHANGE UP (ref 27–31)
MCHC RBC-ENTMCNC: 32 G/DL — SIGNIFICANT CHANGE UP (ref 32–37)
MCV RBC AUTO: 92.1 FL — SIGNIFICANT CHANGE UP (ref 81–99)
MONOCYTES # BLD AUTO: 0.38 K/UL — SIGNIFICANT CHANGE UP (ref 0.1–0.6)
MONOCYTES NFR BLD AUTO: 5.3 % — SIGNIFICANT CHANGE UP (ref 1.7–9.3)
NEUTROPHILS # BLD AUTO: 4.63 K/UL — SIGNIFICANT CHANGE UP (ref 1.4–6.5)
NEUTROPHILS NFR BLD AUTO: 64.8 % — SIGNIFICANT CHANGE UP (ref 42.2–75.2)
NRBC # BLD: 0 /100 WBCS — SIGNIFICANT CHANGE UP (ref 0–0)
PHOSPHATE SERPL-MCNC: 1.9 MG/DL — LOW (ref 2.1–4.9)
PLATELET # BLD AUTO: 189 K/UL — SIGNIFICANT CHANGE UP (ref 130–400)
POTASSIUM SERPL-MCNC: 3.9 MMOL/L — SIGNIFICANT CHANGE UP (ref 3.5–5)
POTASSIUM SERPL-SCNC: 3.9 MMOL/L — SIGNIFICANT CHANGE UP (ref 3.5–5)
PROT SERPL-MCNC: 5.7 G/DL — LOW (ref 6–8)
PROTHROM AB SERPL-ACNC: 13.8 SEC — HIGH (ref 9.95–12.87)
RBC # BLD: 3.43 M/UL — LOW (ref 4.2–5.4)
RBC # FLD: 12.6 % — SIGNIFICANT CHANGE UP (ref 11.5–14.5)
SODIUM SERPL-SCNC: 139 MMOL/L — SIGNIFICANT CHANGE UP (ref 135–146)
WBC # BLD: 7.15 K/UL — SIGNIFICANT CHANGE UP (ref 4.8–10.8)
WBC # FLD AUTO: 7.15 K/UL — SIGNIFICANT CHANGE UP (ref 4.8–10.8)

## 2022-01-18 PROCEDURE — 99232 SBSQ HOSP IP/OBS MODERATE 35: CPT

## 2022-01-18 PROCEDURE — 99231 SBSQ HOSP IP/OBS SF/LOW 25: CPT | Mod: 57

## 2022-01-18 RX ORDER — LANOLIN ALCOHOL/MO/W.PET/CERES
1 CREAM (GRAM) TOPICAL ONCE
Refills: 0 | Status: COMPLETED | OUTPATIENT
Start: 2022-01-18 | End: 2022-01-19

## 2022-01-18 RX ADMIN — OXYCODONE HYDROCHLORIDE 5 MILLIGRAM(S): 5 TABLET ORAL at 12:16

## 2022-01-18 RX ADMIN — Medication 25 GRAM(S): at 00:38

## 2022-01-18 RX ADMIN — CHLORHEXIDINE GLUCONATE 1 APPLICATION(S): 213 SOLUTION TOPICAL at 12:14

## 2022-01-18 RX ADMIN — PANTOPRAZOLE SODIUM 40 MILLIGRAM(S): 20 TABLET, DELAYED RELEASE ORAL at 17:00

## 2022-01-18 RX ADMIN — Medication 200 MILLIGRAM(S): at 17:00

## 2022-01-18 RX ADMIN — Medication 650 MILLIGRAM(S): at 07:33

## 2022-01-18 RX ADMIN — Medication 200 MILLIGRAM(S): at 05:23

## 2022-01-18 RX ADMIN — Medication 650 MILLIGRAM(S): at 08:30

## 2022-01-18 RX ADMIN — HEPARIN SODIUM 5000 UNIT(S): 5000 INJECTION INTRAVENOUS; SUBCUTANEOUS at 08:36

## 2022-01-18 RX ADMIN — POTASSIUM PHOSPHATE, MONOBASIC POTASSIUM PHOSPHATE, DIBASIC 62.5 MILLIMOLE(S): 236; 224 INJECTION, SOLUTION INTRAVENOUS at 05:23

## 2022-01-18 RX ADMIN — Medication 650 MILLIGRAM(S): at 17:42

## 2022-01-18 RX ADMIN — PANTOPRAZOLE SODIUM 40 MILLIGRAM(S): 20 TABLET, DELAYED RELEASE ORAL at 05:22

## 2022-01-18 RX ADMIN — SODIUM CHLORIDE 125 MILLILITER(S): 9 INJECTION INTRAMUSCULAR; INTRAVENOUS; SUBCUTANEOUS at 17:01

## 2022-01-18 RX ADMIN — OXYCODONE HYDROCHLORIDE 5 MILLIGRAM(S): 5 TABLET ORAL at 12:55

## 2022-01-18 RX ADMIN — HEPARIN SODIUM 5000 UNIT(S): 5000 INJECTION INTRAVENOUS; SUBCUTANEOUS at 16:05

## 2022-01-18 RX ADMIN — Medication 650 MILLIGRAM(S): at 17:01

## 2022-01-18 NOTE — PROGRESS NOTE ADULT - ATTENDING COMMENTS
patietn seen. feels better, abdomen soft, not tender. paticelinan refused EGD by GI. for lap bernie in am, risks and potential complications were discussed with the patient, she understood and agreed to proceed.

## 2022-01-18 NOTE — PRE-ANESTHESIA EVALUATION ADULT - NSANTHPMHFT_GEN_ALL_CORE
Reason for Admission: BILIARY COLIC  History of Present Illness:   37 y/o female with PMHx of HTN, Anemia presents to ED c/o abdominal pain. States that the pain started last night at 7pm after she ate greasy food. Pain is located over the RUQ, radiates to the back, associated with nausea and multiple episodes of vomiting.  She states that she had similar episodes in the past after eating large meals and fatty food. Denies any fever or chills

## 2022-01-18 NOTE — PROGRESS NOTE ADULT - ATTENDING SUPERVISION STATEMENT
ACP Is This A New Presentation, Or A Follow-Up?: Skin Lesion How Severe Is Your Skin Lesion?: mild Has Your Skin Lesion Been Treated?: not been treated

## 2022-01-19 ENCOUNTER — RESULT REVIEW (OUTPATIENT)
Age: 37
End: 2022-01-19

## 2022-01-19 LAB
ANION GAP SERPL CALC-SCNC: 11 MMOL/L — SIGNIFICANT CHANGE UP (ref 7–14)
BUN SERPL-MCNC: 4 MG/DL — LOW (ref 10–20)
CALCIUM SERPL-MCNC: 8.1 MG/DL — LOW (ref 8.5–10.1)
CHLORIDE SERPL-SCNC: 110 MMOL/L — SIGNIFICANT CHANGE UP (ref 98–110)
CO2 SERPL-SCNC: 21 MMOL/L — SIGNIFICANT CHANGE UP (ref 17–32)
CREAT SERPL-MCNC: 0.5 MG/DL — LOW (ref 0.7–1.5)
GLUCOSE SERPL-MCNC: 76 MG/DL — SIGNIFICANT CHANGE UP (ref 70–99)
MAGNESIUM SERPL-MCNC: 2.1 MG/DL — SIGNIFICANT CHANGE UP (ref 1.8–2.4)
PHOSPHATE SERPL-MCNC: 3.2 MG/DL — SIGNIFICANT CHANGE UP (ref 2.1–4.9)
POTASSIUM SERPL-MCNC: 4.5 MMOL/L — SIGNIFICANT CHANGE UP (ref 3.5–5)
POTASSIUM SERPL-SCNC: 4.5 MMOL/L — SIGNIFICANT CHANGE UP (ref 3.5–5)
SODIUM SERPL-SCNC: 142 MMOL/L — SIGNIFICANT CHANGE UP (ref 135–146)

## 2022-01-19 PROCEDURE — 99231 SBSQ HOSP IP/OBS SF/LOW 25: CPT | Mod: 57

## 2022-01-19 PROCEDURE — 47562 LAPAROSCOPIC CHOLECYSTECTOMY: CPT

## 2022-01-19 PROCEDURE — 88304 TISSUE EXAM BY PATHOLOGIST: CPT | Mod: 26

## 2022-01-19 RX ORDER — PANTOPRAZOLE SODIUM 20 MG/1
40 TABLET, DELAYED RELEASE ORAL
Refills: 0 | Status: DISCONTINUED | OUTPATIENT
Start: 2022-01-19 | End: 2022-01-20

## 2022-01-19 RX ORDER — LABETALOL HCL 100 MG
200 TABLET ORAL
Refills: 0 | Status: DISCONTINUED | OUTPATIENT
Start: 2022-01-19 | End: 2022-01-20

## 2022-01-19 RX ORDER — POTASSIUM PHOSPHATE, MONOBASIC POTASSIUM PHOSPHATE, DIBASIC 236; 224 MG/ML; MG/ML
30 INJECTION, SOLUTION INTRAVENOUS ONCE
Refills: 0 | Status: COMPLETED | OUTPATIENT
Start: 2022-01-19 | End: 2022-01-19

## 2022-01-19 RX ORDER — OXYCODONE HYDROCHLORIDE 5 MG/1
5 TABLET ORAL EVERY 6 HOURS
Refills: 0 | Status: DISCONTINUED | OUTPATIENT
Start: 2022-01-19 | End: 2022-01-20

## 2022-01-19 RX ORDER — HYDROMORPHONE HYDROCHLORIDE 2 MG/ML
1 INJECTION INTRAMUSCULAR; INTRAVENOUS; SUBCUTANEOUS
Refills: 0 | Status: DISCONTINUED | OUTPATIENT
Start: 2022-01-19 | End: 2022-01-19

## 2022-01-19 RX ORDER — ACETAMINOPHEN 500 MG
650 TABLET ORAL EVERY 6 HOURS
Refills: 0 | Status: DISCONTINUED | OUTPATIENT
Start: 2022-01-19 | End: 2022-01-20

## 2022-01-19 RX ORDER — CHLORHEXIDINE GLUCONATE 213 G/1000ML
1 SOLUTION TOPICAL DAILY
Refills: 0 | Status: DISCONTINUED | OUTPATIENT
Start: 2022-01-19 | End: 2022-01-20

## 2022-01-19 RX ORDER — HEPARIN SODIUM 5000 [USP'U]/ML
5000 INJECTION INTRAVENOUS; SUBCUTANEOUS EVERY 8 HOURS
Refills: 0 | Status: DISCONTINUED | OUTPATIENT
Start: 2022-01-19 | End: 2022-01-20

## 2022-01-19 RX ORDER — SODIUM CHLORIDE 9 MG/ML
1000 INJECTION, SOLUTION INTRAVENOUS
Refills: 0 | Status: DISCONTINUED | OUTPATIENT
Start: 2022-01-19 | End: 2022-01-19

## 2022-01-19 RX ORDER — ONDANSETRON 8 MG/1
4 TABLET, FILM COATED ORAL ONCE
Refills: 0 | Status: DISCONTINUED | OUTPATIENT
Start: 2022-01-19 | End: 2022-01-19

## 2022-01-19 RX ORDER — HYDROMORPHONE HYDROCHLORIDE 2 MG/ML
2 INJECTION INTRAMUSCULAR; INTRAVENOUS; SUBCUTANEOUS
Refills: 0 | Status: DISCONTINUED | OUTPATIENT
Start: 2022-01-19 | End: 2022-01-19

## 2022-01-19 RX ORDER — HYDROMORPHONE HYDROCHLORIDE 2 MG/ML
0.5 INJECTION INTRAMUSCULAR; INTRAVENOUS; SUBCUTANEOUS
Refills: 0 | Status: DISCONTINUED | OUTPATIENT
Start: 2022-01-19 | End: 2022-01-19

## 2022-01-19 RX ORDER — SODIUM CHLORIDE 9 MG/ML
1000 INJECTION, SOLUTION INTRAVENOUS
Refills: 0 | Status: DISCONTINUED | OUTPATIENT
Start: 2022-01-19 | End: 2022-01-20

## 2022-01-19 RX ADMIN — Medication 650 MILLIGRAM(S): at 00:24

## 2022-01-19 RX ADMIN — Medication 1 MILLIGRAM(S): at 00:24

## 2022-01-19 RX ADMIN — Medication 650 MILLIGRAM(S): at 23:01

## 2022-01-19 RX ADMIN — Medication 650 MILLIGRAM(S): at 05:14

## 2022-01-19 RX ADMIN — SODIUM CHLORIDE 100 MILLILITER(S): 9 INJECTION, SOLUTION INTRAVENOUS at 20:00

## 2022-01-19 RX ADMIN — Medication 200 MILLIGRAM(S): at 21:57

## 2022-01-19 RX ADMIN — POTASSIUM PHOSPHATE, MONOBASIC POTASSIUM PHOSPHATE, DIBASIC 83.33 MILLIMOLE(S): 236; 224 INJECTION, SOLUTION INTRAVENOUS at 02:00

## 2022-01-19 RX ADMIN — HEPARIN SODIUM 5000 UNIT(S): 5000 INJECTION INTRAVENOUS; SUBCUTANEOUS at 00:25

## 2022-01-19 RX ADMIN — OXYCODONE HYDROCHLORIDE 5 MILLIGRAM(S): 5 TABLET ORAL at 22:55

## 2022-01-19 RX ADMIN — HEPARIN SODIUM 5000 UNIT(S): 5000 INJECTION INTRAVENOUS; SUBCUTANEOUS at 21:57

## 2022-01-19 RX ADMIN — PANTOPRAZOLE SODIUM 40 MILLIGRAM(S): 20 TABLET, DELAYED RELEASE ORAL at 05:14

## 2022-01-19 RX ADMIN — Medication 200 MILLIGRAM(S): at 05:14

## 2022-01-19 NOTE — CHART NOTE - NSCHARTNOTEFT_GEN_A_CORE
Pre-Op Note  Patient: EVELINA READ  MRN: [id number]  36y Female  Location: [address]  22 @ 01:19    Admit Diagnosis: CHOLELITHIASIS    Procedure: Laparoscopic Cholecystectomy     Consent in Chart: [x] Yes [  ] No  Diet: Diet, NPO after Midnight:      NPO Start Date: 2022,   NPO Start Time: 23:59 (22 @ 15:56)    Fluids: potassium phosphate IVPB 30 milliMole(s) IV Intermittent once  sodium chloride 0.9%. 1000 milliLiter(s) (125 mL/Hr) IV Continuous <Continuous>    EK Lead ECG:   Ventricular Rate 69 BPM    Atrial Rate 69 BPM    P-R Interval 174 ms    QRS Duration 82 ms    Q-T Interval 420 ms    QTC Calculation(Bazett) 450 ms    P Axis 61 degrees    R Axis 52 degrees    T Axis 46 degrees    Diagnosis Line Normal sinus rhythm  Normal ECG    Confirmed by Ilana Hoffman MD ([id number]) on 2022 12:27:25 PM (22 @ 03:32)    CXR:  Xray Chest 1 View-PORTABLE IMMEDIATE:   ACC: [id number] EXAM:  XR CHEST PORTABLE IMMED 1V                          PROCEDURE DATE:  2022          INTERPRETATION:  Clinical History / Reason for exam: Abdominal pain    Comparison : Chest radiograph None.    Technique/Positioning: Single PA radiograph the chest.    Findings:    Support devices: None.    Cardiac/mediastinum/hilum: Unremarkable.    Lung parenchyma/Pleura: No focal consolidation, pleural effusion or   pneumothorax.    Skeleton/soft tissues: Unremarkable.    Impression:    No radiographic evidence of acute cardiopulmonary disease.        --- End of Report ---            JOSÉ LUIS ROGERS MD; Attending Radiologist  This document has been electronically signed. 2022  7:01AM (22 @ 06:05)      Vitals:  T(F): 97.3 (22 @ 20:00), Max: 98.3 (22 @ 13:44)  HR: 85 (22 @ 20:00) (71 - 89)  BP: 123/70 (22 @ 20:00) (118/72 - 141/77)  RR: 18 (22 @ 20:00) (18 - 18)  SpO2: 97% (22 @ 20:00) (97% - 98%)    Pre-OP Labs:  CAPILLARY BLOOD GLUCOSE                              10.1   7.15  )-----------( 189      ( 2022 22:40 )             31.6         139  |  108  |  5<L>  ----------------------------<  88  3.9   |  20  |  0.6<L>    Ca    7.9<L>      2022 22:40  Phos  1.9       Mg     2.2         TPro  5.7<L>  /  Alb  3.5  /  TBili  0.2  /  DBili  <0.2  /  AST  14  /  ALT  11  /  AlkPhos  63      PT/INR - ( 2022 22:40 )   PT: 13.80 sec;   INR: 1.20 ratio         PTT - ( 2022 22:40 )  PTT:36.8 sec      Type & Screen: ABO RH Interpretation: O NEG (22 @ 22:34)      Pregnancy Test: *** Serum Pregnancy: Negative (22 @ 03:03)      COVID: COVID-19 PCR: NotDetec (2022 05:35)

## 2022-01-19 NOTE — CHART NOTE - NSCHARTNOTESELECT_GEN_ALL_CORE
Event Note Doxepin Pregnancy And Lactation Text: This medication is Pregnancy Category C and it isn't known if it is safe during pregnancy. It is also excreted in breast milk and breast feeding isn't recommended.

## 2022-01-19 NOTE — CHART NOTE - NSCHARTNOTEFT_GEN_A_CORE
PACU ANESTHESIA ADMISSION NOTE      Procedure: Laparoscopic cholecystectomy using multiple ports      Post op diagnosis:  Acute cholecystitis        ____  Intubated  TV:______       Rate: ______      FiO2: ______    ___x_  Patent Airway    ____  Full return of protective reflexes    ____  Full recovery from anesthesia / back to baseline status    Vitals:  T(C): 36.3   HR: 78  BP: 135/84   RR: 18   SpO2: 98%     Mental Status:  __x__ Awake   _____ Alert   _____ Drowsy   _____ Sedated    Nausea/Vomiting:  ____ Yes, See Post - Op Orders      __x__ No    Pain Scale (0-10):  _____    Treatment: ____ None    __x__ See Post - Op/PCA Orders    Post - Operative Fluids:   ____ Oral   x____ See Post - Op Orders    Plan: Discharge:   ____Home       ___x__Floor     _____Critical Care    _____  Other:_________________    Comments:  report given to RN DC to pacu

## 2022-01-19 NOTE — BRIEF OPERATIVE NOTE - OPERATION/FINDINGS
Moderately inflamed gallbladder. Distended. Aspirated gallbladder, serous/bilious fluid. Hydrops. Cystic duct and artery clipped. Gallbladder taken down. No complications

## 2022-01-20 ENCOUNTER — TRANSCRIPTION ENCOUNTER (OUTPATIENT)
Age: 37
End: 2022-01-20

## 2022-01-20 VITALS
HEART RATE: 83 BPM | SYSTOLIC BLOOD PRESSURE: 123 MMHG | DIASTOLIC BLOOD PRESSURE: 78 MMHG | TEMPERATURE: 99 F | OXYGEN SATURATION: 95 % | RESPIRATION RATE: 18 BRPM

## 2022-01-20 LAB
ALBUMIN SERPL ELPH-MCNC: 4.2 G/DL — SIGNIFICANT CHANGE UP (ref 3.5–5.2)
ALP SERPL-CCNC: 72 U/L — SIGNIFICANT CHANGE UP (ref 30–115)
ALT FLD-CCNC: 34 U/L — SIGNIFICANT CHANGE UP (ref 0–41)
ANION GAP SERPL CALC-SCNC: 15 MMOL/L — HIGH (ref 7–14)
AST SERPL-CCNC: 39 U/L — SIGNIFICANT CHANGE UP (ref 0–41)
BILIRUB DIRECT SERPL-MCNC: <0.2 MG/DL — SIGNIFICANT CHANGE UP (ref 0–0.3)
BILIRUB INDIRECT FLD-MCNC: SIGNIFICANT CHANGE UP MG/DL (ref 0.2–1.2)
BILIRUB SERPL-MCNC: 0.2 MG/DL — SIGNIFICANT CHANGE UP (ref 0.2–1.2)
BUN SERPL-MCNC: 5 MG/DL — LOW (ref 10–20)
CALCIUM SERPL-MCNC: 8.5 MG/DL — SIGNIFICANT CHANGE UP (ref 8.5–10.1)
CHLORIDE SERPL-SCNC: 104 MMOL/L — SIGNIFICANT CHANGE UP (ref 98–110)
CO2 SERPL-SCNC: 19 MMOL/L — SIGNIFICANT CHANGE UP (ref 17–32)
CREAT SERPL-MCNC: 0.7 MG/DL — SIGNIFICANT CHANGE UP (ref 0.7–1.5)
GLUCOSE SERPL-MCNC: 120 MG/DL — HIGH (ref 70–99)
HCT VFR BLD CALC: 33.2 % — LOW (ref 37–47)
HGB BLD-MCNC: 10.8 G/DL — LOW (ref 12–16)
MAGNESIUM SERPL-MCNC: 1.9 MG/DL — SIGNIFICANT CHANGE UP (ref 1.8–2.4)
MCHC RBC-ENTMCNC: 29.5 PG — SIGNIFICANT CHANGE UP (ref 27–31)
MCHC RBC-ENTMCNC: 32.5 G/DL — SIGNIFICANT CHANGE UP (ref 32–37)
MCV RBC AUTO: 90.7 FL — SIGNIFICANT CHANGE UP (ref 81–99)
NRBC # BLD: 0 /100 WBCS — SIGNIFICANT CHANGE UP (ref 0–0)
PHOSPHATE SERPL-MCNC: 3 MG/DL — SIGNIFICANT CHANGE UP (ref 2.1–4.9)
PLATELET # BLD AUTO: 206 K/UL — SIGNIFICANT CHANGE UP (ref 130–400)
POTASSIUM SERPL-MCNC: 4.1 MMOL/L — SIGNIFICANT CHANGE UP (ref 3.5–5)
POTASSIUM SERPL-SCNC: 4.1 MMOL/L — SIGNIFICANT CHANGE UP (ref 3.5–5)
PROT SERPL-MCNC: 6.5 G/DL — SIGNIFICANT CHANGE UP (ref 6–8)
RBC # BLD: 3.66 M/UL — LOW (ref 4.2–5.4)
RBC # FLD: 12.4 % — SIGNIFICANT CHANGE UP (ref 11.5–14.5)
SODIUM SERPL-SCNC: 138 MMOL/L — SIGNIFICANT CHANGE UP (ref 135–146)
WBC # BLD: 9.87 K/UL — SIGNIFICANT CHANGE UP (ref 4.8–10.8)
WBC # FLD AUTO: 9.87 K/UL — SIGNIFICANT CHANGE UP (ref 4.8–10.8)

## 2022-01-20 RX ADMIN — Medication 650 MILLIGRAM(S): at 11:41

## 2022-01-20 RX ADMIN — HEPARIN SODIUM 5000 UNIT(S): 5000 INJECTION INTRAVENOUS; SUBCUTANEOUS at 05:55

## 2022-01-20 RX ADMIN — Medication 650 MILLIGRAM(S): at 11:42

## 2022-01-20 RX ADMIN — CHLORHEXIDINE GLUCONATE 1 APPLICATION(S): 213 SOLUTION TOPICAL at 11:43

## 2022-01-20 RX ADMIN — Medication 650 MILLIGRAM(S): at 05:56

## 2022-01-20 RX ADMIN — PANTOPRAZOLE SODIUM 40 MILLIGRAM(S): 20 TABLET, DELAYED RELEASE ORAL at 05:55

## 2022-01-20 RX ADMIN — Medication 200 MILLIGRAM(S): at 11:38

## 2022-01-20 NOTE — PROGRESS NOTE ADULT - SUBJECTIVE AND OBJECTIVE BOX
GENERAL SURGERY PROGRESS NOTE    Patient: EVELINA READ , 36y ([birth year])Female   MRN: [id number]  Location: [address]  Visit: 22 Inpatient  Date: 22 @ 09:20    Hospital Day #: 2  Post-Op Day #: None    Procedure/Dx/Injuries: 37 y/o female with PMHx of HTN, Anemia presents presenting with abdominal pain 2/2 biliary cholic and cholelithiasis.     Events of past 24 hours: Patient made npo in preparation for possible add-on for Laparoscopic cholecystectomy, patient scheduled for 2022 if no availabilities 2022. Pain has improved, no nausea/vomiting/diarrhea.     PAST MEDICAL & SURGICAL HISTORY:  Essential hypertension    Anemia    H/O:     Vitals:   T(F): 97.1 (22 @ 05:00), Max: 98.1 (22 @ 21:03)  HR: 89 (22 @ 05:00)  BP: 119/65 (22 @ 05:00)  RR: 18 (22 @ 05:00)  SpO2: 98% (22 @ 05:00)      Diet, NPO after Midnight:      NPO Start Date: 2022,   NPO Start Time: 23:59  Diet, Clear Liquid      Fluids:     I & O's:    22 @ 07:01  -  22 @ 07:00  --------------------------------------------------------  IN:    sodium chloride 0.9%: 1125 mL  Total IN: 1125 mL    OUT:    Voided (mL): 200 mL  Total OUT: 200 mL    Total NET: 925 mL    Bowel Movement: : [] YES [x] NO  Flatus: : [] YES [x] NO    PHYSICAL EXAM:  General: NAD, AAOx3,   Cardiac: RRR S1, S2,   Respiratory: CTAB,   Abdomen: Soft, non-distended, mild RUQ tenderness, no rebound or guarding, -pearson sign  Extremities: well perfused    MEDICATIONS  (STANDING):  acetaminophen     Tablet .. 650 milliGRAM(s) Oral every 6 hours  chlorhexidine 4% Liquid 1 Application(s) Topical daily  heparin   Injectable 5000 Unit(s) SubCutaneous every 8 hours  labetalol 200 milliGRAM(s) Oral two times a day  pantoprazole    Tablet 40 milliGRAM(s) Oral two times a day  sodium chloride 0.9%. 1000 milliLiter(s) (125 mL/Hr) IV Continuous <Continuous>    MEDICATIONS  (PRN):  oxyCODONE    IR 5 milliGRAM(s) Oral every 6 hours PRN Moderate Pain (4 - 6)      DVT PROPHYLAXIS: heparin   Injectable 5000 Unit(s) SubCutaneous every 8 hours    GI PROPHYLAXIS: pantoprazole    Tablet 40 milliGRAM(s) Oral two times a day    ANTICOAGULATION:   ANTIBIOTICS:      LAB/STUDIES:  Labs:  CAPILLARY BLOOD GLUCOSE                         10.9   9.11  )-----------( 183      ( 2022 22:34 )             33.1       Auto Neutrophil %: 72.7 % (22 @ 22:34)  Auto Immature Granulocyte %: 0.3 % (22 @ 22:34)        139  |  105  |  8<L>  ----------------------------<  95  3.6   |  21  |  0.8      Calcium, Total Serum: 7.8 mg/dL (22 @ 22:34)      LFTs:             5.8  | 0.7  | 16       ------------------[67      ( 2022 22:34 )  3.7  | <0.2 | 12          Lipase:x      Amylase:x         Lactate, Blood: 1.4 mmol/L (22 @ 03:03)      Coags:     12.50  ----< 1.09    ( 2022 03:03 )     32.0        CARDIAC MARKERS ( 2022 03:19 )  x     / <0.01 ng/mL / x     / x     / x        IMAGING:  < from: Xray Chest 1 View-PORTABLE IMMEDIATE (Xray Chest 1 View-PORTABLE IMMEDIATE .) (22 @ 06:05) >  No radiographic evidence of acute cardiopulmonary disease.    < end of copied text >    ACCESS/ DEVICES:  [x] Peripheral IV  
Patient is a 37 y/o female with PMHx of HTN, Anemia , recent pregnancy who presents to [facility] with c/o abdominal pain. Patient feels overall better. We offered EGD yesterday but she declined. Patient may be going to OR today.       PAST MEDICAL & SURGICAL HISTORY:  Essential hypertension  Anemia  H/O:       MEDICATIONS  (STANDING):  acetaminophen     Tablet .. 650 milliGRAM(s) Oral every 6 hours  chlorhexidine 4% Liquid 1 Application(s) Topical daily  heparin   Injectable 5000 Unit(s) SubCutaneous every 8 hours  ibuprofen  Tablet. 400 milliGRAM(s) Oral every 8 hours  labetalol 200 milliGRAM(s) Oral two times a day  pantoprazole    Tablet 40 milliGRAM(s) Oral before breakfast  sodium chloride 0.9%. 1000 milliLiter(s) (125 mL/Hr) IV Continuous <Continuous>    MEDICATIONS  (PRN):  oxyCODONE    IR 5 milliGRAM(s) Oral every 6 hours PRN Moderate Pain (4 - 6)      Allergies  No Known Allergies      Review of Systems  General:  Denies Fatigue, Denies Fever, Denies Weakness ,Denies Weight Loss   HEENT: Denies Trouble Swallowing ,Denies  Sore Throat , Denies Change in hearing/vision/speech ,Denies Dizziness    Cardio: Denies  Chest Pain , Palpitations    Respiratory: Denies worsening of SOB, Denies Cough  Abdomen: See detailed HPI  Neuro: Denies Headache Denies Dizziness, Denies Paresthesias  MSK: Denies pain in Bones/Joints/Muscles   Psych: Patient denies depression, denies suicidal or homicidal ideations  Integ: Patient Denies rash, or new skin lesions       Vital Signs L  T(F): 97.1 (2022 05:00), Max: 98.1 (2022 21:03)  HR: 89 (2022 05:00) (75 - 89)  BP: 119/65 (2022 05:00) (108/58 - 134/81)  RR: 18 (2022 05:00) (18 - 20)  SpO2: 98% (2022 05:00) (95% - 98%)  Physical Exam  Gen: NAD  HEENT: NC/AT, Mucosal Membranes  Cardio: S1/S2 No S3/S4, Regular  Resp: CTA B/L  Abdomen: Soft, ND/NT  Neuro: AAOx3, Cranial Nerve II-XII intact   Extremities: FROM x 4  Skin: no jaundice         Labs:                        10.9   9.11  )-----------( 183      ( 2022 22:34 )             33.1         139  |  105  |  8<L>  ----------------------------<  95  3.6   |  21  |  0.8    Ca    7.8<L>      2022 22:34  Phos  2.5       Mg     1.7         TPro  5.8<L>  /  Alb  3.7  /  TBili  0.7  /  DBili  <0.2  /  AST  16  /  ALT  12  /  AlkPhos  67              RADIOLOGY & ADDITIONAL STUDIES:  US Abdomen Upper Quadrant Right 22   IMPRESSION:      Cholelithiasis without evidence for cholecystitis  0.5 cm right lower pole renal calculus. No hydronephrosis.  2.8 cm right hepatic lobe hyperechoic focus may reflect a hemangioma.      
GENERAL SURGERY PROGRESS NOTE    Patient: EVELINA READ , 36y (81)Female   MRN: [id number]  Location: [address]  Visit: 22 Inpatient  Date: 22 @ 01:15    Hospital Day #: 3  Post-Op Day #: None    Procedure/Dx/Injuries: 37 y/o female with PMHx of HTN, Anemia presents presenting with abdominal pain 2/2 biliary cholic and cholelithiasis.    Events of past 24 hours: Patient was pre-op for OR today. NPO after midnight, IVF. Patient has no nausea/vomiting/fevers/chills.    PAST MEDICAL & SURGICAL HISTORY:  Essential hypertension    Anemia    H/O:     Vitals:   T(F): 97.3 (22 @ 20:00), Max: 98.3 (22 @ 13:44)  HR: 85 (22 @ 20:00)  BP: 123/70 (22 @ 20:00)  RR: 18 (22 @ 20:00)  SpO2: 97% (22 @ 20:00)      Diet, NPO after Midnight:      NPO Start Date: 2022,   NPO Start Time: 23:59  Diet, Clear Liquid      Fluids:     I & O's:    22 @ 07:01  -  22 @ 07:00  --------------------------------------------------------  IN:    sodium chloride 0.9%: 1125 mL  Total IN: 1125 mL    OUT:    Voided (mL): 200 mL  Total OUT: 200 mL    Total NET: 925 mL    Bowel Movement: : [] YES [x NO  Flatus: : [] YES [x] NO    PHYSICAL EXAM:  General: NAD, AAOx3, calm and cooperative  HEENT: EOMI, Trachea ML, Neck supple  Cardiac: RRR  Respiratory: normal respiratory effort  Abdomen: Soft, non-distended, non-tender, no rebound, no guarding   Skin: Warm/dry, normal color, no jaundice    MEDICATIONS  (STANDING):  acetaminophen     Tablet .. 650 milliGRAM(s) Oral every 6 hours  chlorhexidine 4% Liquid 1 Application(s) Topical daily  heparin   Injectable 5000 Unit(s) SubCutaneous every 8 hours  labetalol 200 milliGRAM(s) Oral two times a day  pantoprazole    Tablet 40 milliGRAM(s) Oral two times a day  potassium phosphate IVPB 30 milliMole(s) IV Intermittent once  sodium chloride 0.9%. 1000 milliLiter(s) (125 mL/Hr) IV Continuous <Continuous>    MEDICATIONS  (PRN):  oxyCODONE    IR 5 milliGRAM(s) Oral every 6 hours PRN Moderate Pain (4 - 6)      DVT PROPHYLAXIS: heparin   Injectable 5000 Unit(s) SubCutaneous every 8 hours    GI PROPHYLAXIS: pantoprazole    Tablet 40 milliGRAM(s) Oral two times a day    ANTICOAGULATION:   ANTIBIOTICS:            LAB/STUDIES:  Labs:  CAPILLARY BLOOD GLUCOSE                              10.1   7.15  )-----------( 189      ( 2022 22:40 )             31.6       Auto Neutrophil %: 64.8 % (22 @ 22:40)  Auto Immature Granulocyte %: 0.1 % (22 @ 22:40)        139  |  108  |  5<L>  ----------------------------<  88  3.9   |  20  |  0.6<L>      Calcium, Total Serum: 7.9 mg/dL (22 @ 22:40)      LFTs:             5.7  | 0.2  | 14       ------------------[63      ( 2022 22:40 )  3.5  | <0.2 | 11          Lipase:x      Amylase:x         Lactate, Blood: 1.4 mmol/L (22 @ 03:03)      Coags:     13.80  ----< 1.20    ( 2022 22:40 )     36.8        CARDIAC MARKERS ( 2022 03:19 )  x     / <0.01 ng/mL / x     / x     / x        IMAGING:  < from: Xray Chest 1 View-PORTABLE IMMEDIATE (Xray Chest 1 View-PORTABLE IMMEDIATE .) (22 @ 06:05) >  No radiographic evidence of acute cardiopulmonary disease.    < end of copied text >      ACCESS/ DEVICES:  [x] Peripheral IV  
GENERAL SURGERY PROGRESS NOTE AND POST OP CHECK    Patient: EVELINA READ , 36y (81)Female   MRN: [id number]  Location: [address]  Visit: 22 Inpatient  Date: 22 @ 00:54    Hospital Day #: 4  Post-Op Day #:1    Procedure/Dx/Injuries: s/p Lap Delilah    Events of past 24 hours:  Pt tolerated surgery well has no N/V, tolerated CLD,  abdominal pain is minimal, has voided and ambulated    PAST MEDICAL & SURGICAL HISTORY:  Essential hypertension    Anemia    H/O:         Vitals:   T(F): 97.7 (22 @ 00:17), Max: 98 (22 @ 21:28)  HR: 84 (22 @ 00:17)  BP: 148/75 (22 @ 00:17)  RR: 18 (22 @ 00:17)  SpO2: 97% (22 @ 00:17)      Diet, Clear Liquid      Fluids: lactated ringers.: Solution, 1000 milliLiter(s) infuse at 100 mL/Hr      I & O's:    22 @ 07:01  -  22 @ 07:00  --------------------------------------------------------  IN:  Total IN: 0 mL    OUT:    Voided (mL): 2500 mL  Total OUT: 2500 mL    Total NET: -2500 mL        Bowel Movement: : [] YES [] NO  Flatus: : [x] YES [] NO    PHYSICAL EXAM:  General: NAD, AAOx3, calm and cooperative  Cardiac: RRR   Respiratory: CTAB, normal respiratory effort,   Abdomen: Soft, non-distended, mild RUQ tenderness, no rebound, no guarding. +BS.  Skin: Warm/dry, normal color, no jaundice  Incision/wound: healing well, dressings in place, clean, dermabond wound closure    MEDICATIONS  (STANDING):  acetaminophen     Tablet .. 650 milliGRAM(s) Oral every 6 hours  chlorhexidine 4% Liquid 1 Application(s) Topical daily  heparin   Injectable 5000 Unit(s) SubCutaneous every 8 hours  labetalol 200 milliGRAM(s) Oral two times a day  lactated ringers. 1000 milliLiter(s) (100 mL/Hr) IV Continuous <Continuous>  pantoprazole    Tablet 40 milliGRAM(s) Oral two times a day    MEDICATIONS  (PRN):  oxyCODONE    IR 5 milliGRAM(s) Oral every 6 hours PRN Moderate Pain (4 - 6)      DVT PROPHYLAXIS: heparin   Injectable 5000 Unit(s) SubCutaneous every 8 hours    GI PROPHYLAXIS: pantoprazole    Tablet 40 milliGRAM(s) Oral two times a day    ANTICOAGULATION:   ANTIBIOTICS:            LAB/STUDIES:  Labs:  CAPILLARY BLOOD GLUCOSE                              10.1   7.15  )-----------( 189      ( 2022 22:40 )             31.6             142  |  110  |  4<L>  ----------------------------<  76  4.5   |  21  |  0.5<L>      Calcium, Total Serum: 8.1 mg/dL (22 @ 08:32)      LFTs:             5.7  | 0.2  | 14       ------------------[63      ( 2022 22:40 )  3.5  | <0.2 | 11          Lipase:x      Amylase:x         Lactate, Blood: 1.4 mmol/L (22 @ 03:03)      Coags:     13.80  ----< 1.20    ( 2022 22:40 )     36.8                            IMAGING:      ACCESS/ DEVICES:  [x] Peripheral IV  [ ] Central Venous Line	[ ] R	[ ] L	[ ] IJ	[ ] Fem	[ ] SC	Placed:   [ ] Arterial Line		[ ] R	[ ] L	[ ] Fem	[ ] Rad	[ ] Ax	Placed:   [ ] PICC:					[ ] Mediport  [ ] Urinary Catheter,  Date Placed:   [ ] Chest tube: [ ] Right, [ ] Left  [ ] CARYE/Sammy Drains      ASSESSMENT:

## 2022-01-20 NOTE — DISCHARGE NOTE NURSING/CASE MANAGEMENT/SOCIAL WORK - NSDCPEFALRISK_GEN_ALL_CORE
For information on Fall & Injury Prevention, visit: [email or web address] OR  [email or web address] OR  https://www.cdc.gov/steadi/patient.html

## 2022-01-20 NOTE — DISCHARGE NOTE NURSING/CASE MANAGEMENT/SOCIAL WORK - PATIENT PORTAL LINK FT
You can access the FollowMyHealth Patient Portal offered by [facility] by registering at the following website: [email or web address]. By joining [employer]’s FollowMyHealth portal, you will also be able to view your health information using other applications (apps) compatible with our system.

## 2022-01-20 NOTE — DISCHARGE NOTE PROVIDER - CARE PROVIDER_API CALL
Cullen Santamaria (MD)  Surgery; Surgical Critical Care  [address] [place] [id number]  Phone: [phone]  Fax: [phone]  Follow Up Time:

## 2022-01-20 NOTE — DISCHARGE NOTE PROVIDER - HOSPITAL COURSE
37 y/o F with pmhx of HTN and anemia presented to the ED c/o abdominal pain. US showed cholelithiasis without evidence of acute cholecystitis. Patient was admitted for biliary colic / symptomatic cholelithiasis. On presentation, patient was complaining of GERD symptoms and upper abdominal pain. She had never gotten an endoscopy so GI follow up was recommended.     She was made NPO, given IVF, and taken to the OR for a laparoscopic cholecystectomy.   Patient tolerated the procedure well. She is tolerating a regular diet, ambulating, passing gas and having BMs.   Patient is pain controlled.  Vitally and hemodynamically stable at time of discharge. Patient will follow up in office in 2 weeks

## 2022-01-20 NOTE — PROGRESS NOTE ADULT - ASSESSMENT
36y F w/ PMHx of HTN and Anemia presented with symptomatic cholelithiasis, is s/p lap bernie    PLAN:  -advance diet to regular low fat   - monitor LFT's in am  - monitor labs  - monitor vitals  - ambulate as tolerated  - GI and DVT ppx    Lines/Tubes: PIV,     TRAUMA TEAM SPECTRA: [id number]  
ASSESSMENT:  37 y/o female with PMHx of HTN, Anemia presents presenting with abdominal pain 2/2 biliary cholic and cholelithiasis.    PLAN:  - OR today, NPO after midnight, IVF  - Pain Management  - Strict Ins and Out  - Continue Current Diet Orders  - K>4, MG>2, Phos>3    Lines/Tubes: PIV    TRAUMA SPECTRA: [id number]
Patient is a 37 y/o female with PMHx of HTN, Anemia , recent pregnancy who presents to [facility] with c/o abdominal pain. Patient feels overall better. We offered EGD yesterday but she declined. Patient may be going to OR today. LFT normal.     Biliary Colic/ NSAID use  - Patient going for OR today maybe  - LFT reassuring  - CBD 3 mm  - Continue Pantoprazole 40mg daily on discharge  - Follow up with Dr Chris Esqueda [phone] - non urgent upon discharge   - Recall GI as needed   
ASSESSMENT:  37 y/o female with PMHx of HTN, Anemia presents presenting with abdominal pain 2/2 biliary cholic and cholelithiasis.    PLAN:  - PreOp: NPO after midnight, IVF, CBC, BMP, Ca, Mg, Phosphorus, PT, PTT, INR, Type and Screen, Type and Cross, Within admission ECG and CXR, Active Covid PCR  - Pain Management  - Strict Ins and Out  - Continue Current Diet Orders  - K>4, MG>2, Phos>3    Lines/Tubes: PIV    TRAUMA SPECTRA: [id number]

## 2022-01-20 NOTE — DISCHARGE NOTE PROVIDER - NSDCCPCAREPLAN_GEN_ALL_CORE_FT
PRINCIPAL DISCHARGE DIAGNOSIS  Diagnosis: Cholelithiases  Assessment and Plan of Treatment: You were found to have gallstones which were causing you to have abdominal pain.   You were admitted for a laparoscopic cholecystectomy (removal of gallbladder).   You tolerated the procedure well. You should take tylenol and motrin as needed for pain control.   No need for antibiotics.   You should follow up in the office in 2 weeks with Dr. Santamaria.  If you experience severe pain, fever, nausea, vomiting, drainage from incisions- call office or report to nearest Emergency department.

## 2022-01-26 LAB — SURGICAL PATHOLOGY STUDY: SIGNIFICANT CHANGE UP

## 2022-01-27 ENCOUNTER — INPATIENT (INPATIENT)
Facility: HOSPITAL | Age: 37
LOS: 2 days | Discharge: HOME | End: 2022-01-30
Attending: SURGERY | Admitting: SURGERY
Payer: COMMERCIAL

## 2022-01-27 VITALS
HEART RATE: 74 BPM | TEMPERATURE: 98 F | RESPIRATION RATE: 18 BRPM | SYSTOLIC BLOOD PRESSURE: 146 MMHG | OXYGEN SATURATION: 100 % | WEIGHT: 164.91 LBS | HEIGHT: 62 IN

## 2022-01-27 DIAGNOSIS — Z98.891 HISTORY OF UTERINE SCAR FROM PREVIOUS SURGERY: Chronic | ICD-10-CM

## 2022-01-27 DIAGNOSIS — Z90.49 ACQUIRED ABSENCE OF OTHER SPECIFIED PARTS OF DIGESTIVE TRACT: Chronic | ICD-10-CM

## 2022-01-27 LAB
ALBUMIN SERPL ELPH-MCNC: 4.9 G/DL — SIGNIFICANT CHANGE UP (ref 3.5–5.2)
ALBUMIN SERPL ELPH-MCNC: 4.9 G/DL — SIGNIFICANT CHANGE UP (ref 3.5–5.2)
ALP SERPL-CCNC: 442 U/L — HIGH (ref 30–115)
ALP SERPL-CCNC: 444 U/L — HIGH (ref 30–115)
ALT FLD-CCNC: 1050 U/L — HIGH (ref 0–41)
ALT FLD-CCNC: 1052 U/L — HIGH (ref 0–41)
ANION GAP SERPL CALC-SCNC: 17 MMOL/L — HIGH (ref 7–14)
APPEARANCE UR: ABNORMAL
APTT BLD: 40.9 SEC — HIGH (ref 27–39.2)
AST SERPL-CCNC: 426 U/L — HIGH (ref 0–41)
AST SERPL-CCNC: 429 U/L — HIGH (ref 0–41)
BACTERIA # UR AUTO: NEGATIVE — SIGNIFICANT CHANGE UP
BASOPHILS # BLD AUTO: 0.05 K/UL — SIGNIFICANT CHANGE UP (ref 0–0.2)
BASOPHILS NFR BLD AUTO: 0.7 % — SIGNIFICANT CHANGE UP (ref 0–1)
BILIRUB DIRECT SERPL-MCNC: 4.1 MG/DL — HIGH (ref 0–0.3)
BILIRUB INDIRECT FLD-MCNC: 0.9 MG/DL — SIGNIFICANT CHANGE UP (ref 0.2–1.2)
BILIRUB SERPL-MCNC: 4.9 MG/DL — HIGH (ref 0.2–1.2)
BILIRUB SERPL-MCNC: 5 MG/DL — HIGH (ref 0.2–1.2)
BILIRUB UR-MCNC: ABNORMAL
BLD GP AB SCN SERPL QL: SIGNIFICANT CHANGE UP
BUN SERPL-MCNC: 11 MG/DL — SIGNIFICANT CHANGE UP (ref 10–20)
CALCIUM SERPL-MCNC: 9.7 MG/DL — SIGNIFICANT CHANGE UP (ref 8.5–10.1)
CHLORIDE SERPL-SCNC: 99 MMOL/L — SIGNIFICANT CHANGE UP (ref 98–110)
CO2 SERPL-SCNC: 18 MMOL/L — SIGNIFICANT CHANGE UP (ref 17–32)
COLOR SPEC: ABNORMAL
CREAT SERPL-MCNC: 0.7 MG/DL — SIGNIFICANT CHANGE UP (ref 0.7–1.5)
DIFF PNL FLD: NEGATIVE — SIGNIFICANT CHANGE UP
EOSINOPHIL # BLD AUTO: 0.2 K/UL — SIGNIFICANT CHANGE UP (ref 0–0.7)
EOSINOPHIL NFR BLD AUTO: 2.8 % — SIGNIFICANT CHANGE UP (ref 0–8)
EPI CELLS # UR: 5 /HPF — SIGNIFICANT CHANGE UP (ref 0–5)
GLUCOSE BLDC GLUCOMTR-MCNC: 70 MG/DL — SIGNIFICANT CHANGE UP (ref 70–99)
GLUCOSE SERPL-MCNC: 88 MG/DL — SIGNIFICANT CHANGE UP (ref 70–99)
GLUCOSE UR QL: NEGATIVE — SIGNIFICANT CHANGE UP
HCG SERPL QL: NEGATIVE — SIGNIFICANT CHANGE UP
HCT VFR BLD CALC: 37.9 % — SIGNIFICANT CHANGE UP (ref 37–47)
HGB BLD-MCNC: 12.6 G/DL — SIGNIFICANT CHANGE UP (ref 12–16)
HYALINE CASTS # UR AUTO: 14 /LPF — HIGH (ref 0–7)
IMM GRANULOCYTES NFR BLD AUTO: 0.3 % — SIGNIFICANT CHANGE UP (ref 0.1–0.3)
INR BLD: 1.11 RATIO — SIGNIFICANT CHANGE UP (ref 0.65–1.3)
KETONES UR-MCNC: ABNORMAL
LACTATE SERPL-SCNC: 0.7 MMOL/L — SIGNIFICANT CHANGE UP (ref 0.7–2)
LEUKOCYTE ESTERASE UR-ACNC: NEGATIVE — SIGNIFICANT CHANGE UP
LIDOCAIN IGE QN: 48 U/L — SIGNIFICANT CHANGE UP (ref 7–60)
LYMPHOCYTES # BLD AUTO: 1.28 K/UL — SIGNIFICANT CHANGE UP (ref 1.2–3.4)
LYMPHOCYTES # BLD AUTO: 18.1 % — LOW (ref 20.5–51.1)
MCHC RBC-ENTMCNC: 30.1 PG — SIGNIFICANT CHANGE UP (ref 27–31)
MCHC RBC-ENTMCNC: 33.2 G/DL — SIGNIFICANT CHANGE UP (ref 32–37)
MCV RBC AUTO: 90.7 FL — SIGNIFICANT CHANGE UP (ref 81–99)
MONOCYTES # BLD AUTO: 0.52 K/UL — SIGNIFICANT CHANGE UP (ref 0.1–0.6)
MONOCYTES NFR BLD AUTO: 7.3 % — SIGNIFICANT CHANGE UP (ref 1.7–9.3)
NEUTROPHILS # BLD AUTO: 5.01 K/UL — SIGNIFICANT CHANGE UP (ref 1.4–6.5)
NEUTROPHILS NFR BLD AUTO: 70.8 % — SIGNIFICANT CHANGE UP (ref 42.2–75.2)
NITRITE UR-MCNC: NEGATIVE — SIGNIFICANT CHANGE UP
NRBC # BLD: 0 /100 WBCS — SIGNIFICANT CHANGE UP (ref 0–0)
PH UR: 6 — SIGNIFICANT CHANGE UP (ref 5–8)
PLATELET # BLD AUTO: 224 K/UL — SIGNIFICANT CHANGE UP (ref 130–400)
POTASSIUM SERPL-MCNC: 4.1 MMOL/L — SIGNIFICANT CHANGE UP (ref 3.5–5)
POTASSIUM SERPL-SCNC: 4.1 MMOL/L — SIGNIFICANT CHANGE UP (ref 3.5–5)
PROT SERPL-MCNC: 7.4 G/DL — SIGNIFICANT CHANGE UP (ref 6–8)
PROT SERPL-MCNC: 7.5 G/DL — SIGNIFICANT CHANGE UP (ref 6–8)
PROT UR-MCNC: ABNORMAL
PROTHROM AB SERPL-ACNC: 12.8 SEC — SIGNIFICANT CHANGE UP (ref 9.95–12.87)
RBC # BLD: 4.18 M/UL — LOW (ref 4.2–5.4)
RBC # FLD: 13.2 % — SIGNIFICANT CHANGE UP (ref 11.5–14.5)
RBC CASTS # UR COMP ASSIST: 2 /HPF — SIGNIFICANT CHANGE UP (ref 0–4)
SARS-COV-2 RNA SPEC QL NAA+PROBE: SIGNIFICANT CHANGE UP
SODIUM SERPL-SCNC: 134 MMOL/L — LOW (ref 135–146)
SP GR SPEC: 1.03 — SIGNIFICANT CHANGE UP (ref 1.01–1.03)
UROBILINOGEN FLD QL: ABNORMAL
WBC # BLD: 7.08 K/UL — SIGNIFICANT CHANGE UP (ref 4.8–10.8)
WBC # FLD AUTO: 7.08 K/UL — SIGNIFICANT CHANGE UP (ref 4.8–10.8)
WBC UR QL: 2 /HPF — SIGNIFICANT CHANGE UP (ref 0–5)

## 2022-01-27 PROCEDURE — 99254 IP/OBS CNSLTJ NEW/EST MOD 60: CPT

## 2022-01-27 PROCEDURE — 76705 ECHO EXAM OF ABDOMEN: CPT | Mod: 26

## 2022-01-27 PROCEDURE — 99222 1ST HOSP IP/OBS MODERATE 55: CPT | Mod: 24

## 2022-01-27 PROCEDURE — 99285 EMERGENCY DEPT VISIT HI MDM: CPT

## 2022-01-27 RX ORDER — CHLORHEXIDINE GLUCONATE 213 G/1000ML
1 SOLUTION TOPICAL
Refills: 0 | Status: DISCONTINUED | OUTPATIENT
Start: 2022-01-27 | End: 2022-01-30

## 2022-01-27 RX ORDER — PANTOPRAZOLE SODIUM 20 MG/1
40 TABLET, DELAYED RELEASE ORAL DAILY
Refills: 0 | Status: DISCONTINUED | OUTPATIENT
Start: 2022-01-27 | End: 2022-01-30

## 2022-01-27 RX ORDER — METHOCARBAMOL 500 MG/1
750 TABLET, FILM COATED ORAL
Refills: 0 | Status: DISCONTINUED | OUTPATIENT
Start: 2022-01-27 | End: 2022-01-29

## 2022-01-27 RX ORDER — SENNA PLUS 8.6 MG/1
2 TABLET ORAL AT BEDTIME
Refills: 0 | Status: DISCONTINUED | OUTPATIENT
Start: 2022-01-27 | End: 2022-01-30

## 2022-01-27 RX ORDER — POLYETHYLENE GLYCOL 3350 17 G/17G
17 POWDER, FOR SOLUTION ORAL DAILY
Refills: 0 | Status: DISCONTINUED | OUTPATIENT
Start: 2022-01-27 | End: 2022-01-30

## 2022-01-27 RX ORDER — INFLUENZA VIRUS VACCINE 15; 15; 15; 15 UG/.5ML; UG/.5ML; UG/.5ML; UG/.5ML
0.5 SUSPENSION INTRAMUSCULAR ONCE
Refills: 0 | Status: COMPLETED | OUTPATIENT
Start: 2022-01-27 | End: 2022-01-27

## 2022-01-27 RX ORDER — OXYCODONE HYDROCHLORIDE 5 MG/1
5 TABLET ORAL EVERY 6 HOURS
Refills: 0 | Status: DISCONTINUED | OUTPATIENT
Start: 2022-01-27 | End: 2022-01-29

## 2022-01-27 RX ORDER — SODIUM CHLORIDE 9 MG/ML
1000 INJECTION INTRAMUSCULAR; INTRAVENOUS; SUBCUTANEOUS ONCE
Refills: 0 | Status: COMPLETED | OUTPATIENT
Start: 2022-01-27 | End: 2022-01-27

## 2022-01-27 RX ORDER — GABAPENTIN 400 MG/1
300 CAPSULE ORAL THREE TIMES A DAY
Refills: 0 | Status: DISCONTINUED | OUTPATIENT
Start: 2022-01-27 | End: 2022-01-30

## 2022-01-27 RX ORDER — SUCRALFATE 1 G
1 TABLET ORAL ONCE
Refills: 0 | Status: COMPLETED | OUTPATIENT
Start: 2022-01-27 | End: 2022-01-27

## 2022-01-27 RX ORDER — FAMOTIDINE 10 MG/ML
20 INJECTION INTRAVENOUS ONCE
Refills: 0 | Status: COMPLETED | OUTPATIENT
Start: 2022-01-27 | End: 2022-01-27

## 2022-01-27 RX ORDER — SODIUM CHLORIDE 9 MG/ML
1000 INJECTION, SOLUTION INTRAVENOUS
Refills: 0 | Status: DISCONTINUED | OUTPATIENT
Start: 2022-01-27 | End: 2022-01-29

## 2022-01-27 RX ORDER — LABETALOL HCL 100 MG
200 TABLET ORAL
Refills: 0 | Status: DISCONTINUED | OUTPATIENT
Start: 2022-01-27 | End: 2022-01-30

## 2022-01-27 RX ORDER — HEPARIN SODIUM 5000 [USP'U]/ML
5000 INJECTION INTRAVENOUS; SUBCUTANEOUS EVERY 8 HOURS
Refills: 0 | Status: DISCONTINUED | OUTPATIENT
Start: 2022-01-27 | End: 2022-01-28

## 2022-01-27 RX ORDER — SODIUM CHLORIDE 9 MG/ML
1000 INJECTION INTRAMUSCULAR; INTRAVENOUS; SUBCUTANEOUS
Refills: 0 | Status: DISCONTINUED | OUTPATIENT
Start: 2022-01-27 | End: 2022-01-27

## 2022-01-27 RX ORDER — PIPERACILLIN AND TAZOBACTAM 4; .5 G/20ML; G/20ML
3.38 INJECTION, POWDER, LYOPHILIZED, FOR SOLUTION INTRAVENOUS EVERY 8 HOURS
Refills: 0 | Status: DISCONTINUED | OUTPATIENT
Start: 2022-01-27 | End: 2022-01-30

## 2022-01-27 RX ORDER — PIPERACILLIN AND TAZOBACTAM 4; .5 G/20ML; G/20ML
3.38 INJECTION, POWDER, LYOPHILIZED, FOR SOLUTION INTRAVENOUS ONCE
Refills: 0 | Status: COMPLETED | OUTPATIENT
Start: 2022-01-27 | End: 2022-01-27

## 2022-01-27 RX ADMIN — SODIUM CHLORIDE 1000 MILLILITER(S): 9 INJECTION INTRAMUSCULAR; INTRAVENOUS; SUBCUTANEOUS at 15:09

## 2022-01-27 RX ADMIN — PIPERACILLIN AND TAZOBACTAM 200 GRAM(S): 4; .5 INJECTION, POWDER, LYOPHILIZED, FOR SOLUTION INTRAVENOUS at 16:34

## 2022-01-27 RX ADMIN — SENNA PLUS 2 TABLET(S): 8.6 TABLET ORAL at 23:26

## 2022-01-27 RX ADMIN — METHOCARBAMOL 750 MILLIGRAM(S): 500 TABLET, FILM COATED ORAL at 20:19

## 2022-01-27 RX ADMIN — Medication 200 MILLIGRAM(S): at 20:19

## 2022-01-27 RX ADMIN — Medication 1 GRAM(S): at 16:34

## 2022-01-27 RX ADMIN — FAMOTIDINE 20 MILLIGRAM(S): 10 INJECTION INTRAVENOUS at 15:09

## 2022-01-27 NOTE — CONSULT NOTE ADULT - ASSESSMENT
Patient is a 37 y/o female with PMHx of HTN, Anemia , recent pregnancy who si s/p recent CCY done on admission 1/2022 who presents to [facility] with chills and jaundice. She notes since surgery she has been doing relatively well other than constipation. She than noted that she started to see her eyes slowly becoming yellow. Over the past 24 hours she felt the chills on and off and had nausea. She notes no significant pains in the abdominal area other than what she feels to be post surgical soreness. She had an apple to eat and some fluids today. LFT abnormalities noted with T bili to 5 and transaminitis Patient without elevated WBC.     Jaundice/ S/P CCY  - Daily LFT  - No evidence of Cholangitis  - Can get abdominal U/s just to image ducts  - Plan ERCP 1/28  - Does not appear to have Cholangitis but would not hesitate to start ABX if WBC increase or fever develops  - Will follow
Assessment & Plan  37 y/o female with PMHx of HTN, who was recently admitted for recurrent biliary colic on 1/17/2022, and underwent laparoscopic cholecystectomy on 01/19/2022, pt was discharged home on 1/20/22.  Pt returns to the ED today with c/o weakness, chills, and progressive jaundice x 2-3 days.  Pt also states that she has not had BM x 11 days.  Labs in ED suggestive of obstructive Jaundice with US showing CBD dilatation of 0.6 mm.     NEURO:    Acute pain-controlled with tylenol, gabapentin, robaxin, and oxy prn     RESP:   -satting well on RA  -encourage IS  -f/up CXR      CARDS:   # HTN  -continue labetalol 200 two times a day  -keep normotensive    GI/NUTR:   #s/p lap bernie 1/19/22 with obstrcutive jaundice now  -plan for ERCP with GI for tomorrow 1/28    -keep NPO    GI Prophylaxis- PPI    Bowel regimen- senna, miralax    /RENAL:        Monitor UO- no bradley in place  IVF: LR @ 100/hr     Labs:          BUN/Cr- 11/0.7  -->          Electrolytes-Na 134 // K 4.1 // Mg -- //  Phos -- (01-27 @ 13:51)    HEME/ONC:       DVT prophylaxis-Heparin sq, SCDs    Labs: Hb/Hct:  12.6/37.9  -->                      Plts:  224  -->                 PTT/INR:  40.9/1.11  --->     ID:  WBC- 7.08  --->>  Temp trend- 24hrs T(F): 98.1 (01-27 @ 15:17), Max: 98.4 (01-27 @ 12:58)  Antibiotics-piperacillin/tazobactam IVPB.. 3.375 every 8 hours  -monitor for signs of infection       ENDO:     Glucose, Serum: 88 (01-27 @ 13:51)  -HA1C in am    LINES/DRAINS:  PIV    DISPO:    SDU  -d/w Dr. Santamaria

## 2022-01-27 NOTE — PATIENT PROFILE ADULT - FALL HARM RISK - HARM RISK INTERVENTIONS

## 2022-01-27 NOTE — ED ADULT NURSE NOTE - NSIMPLEMENTINTERV_GEN_ALL_ED
Implemented All Universal Safety Interventions:  [place] to call system. Call bell, personal items and telephone within reach. Instruct patient to call for assistance. Room bathroom lighting operational. Non-slip footwear when patient is off stretcher. Physically safe environment: no spills, clutter or unnecessary equipment. Stretcher in lowest position, wheels locked, appropriate side rails in place.

## 2022-01-27 NOTE — ED PROVIDER NOTE - PHYSICAL EXAMINATION
CONSTITUTIONAL: Well-developed; well-nourished; NAD  SKIN: warm, dry, w/o rash  HEAD: NCAT  EYES: PERRLA, EOMI, no conjunctival injection; mild scleral icterus on lateral and medial sides of conjuntiva b/l  ENT: No nasal discharge; nl OP without erythema or exudates; mild yellowing of underside of tongue  NECK: Supple, non-tender  CARD: nl S1, S2; RRR, no MRG, no JVD  RESP: CTAB, normal respiratory effort  ABD: BS+, soft, NTND, no HSM, +epigastric "mild discomfort" with palpation; incision sites clean, dry, intact without erythema or discharge  EXT: Normal ROM.  No clubbing, cyanosis or edema  NEURO: Alert, oriented, grossly unremarkable  PSYCH: Cooperative, appropriate

## 2022-01-27 NOTE — H&P ADULT - ASSESSMENT
36y female with recent Laparoscopic cholecystectomy for recurrent symptomatic cholelithiasis. Presents on POD #8 with obstructive jaundice, suggestive of Choledocholithiasis    Recommendations are as follows:    -Admit to Surgery, admitting attending Dr. Santamaria  -Pain Control: Multimodal with Tylenol 650mg q6hrs, Gabapentin 300mg TID, Robaxin 750mg QID, Oxycodone 5mg q6hrs PRN moderate pain  -CVS: EKG  -Pulmonary: Incentive Spirometry q1 hr while awake  -FEN:  IVF-   LR @ 125  mL/hr, Monitor Strict Intake/Output [id number], Replete Electrolytes PRN, Diet- NPO for possible ERCP  -ABX: ZOSYN  -PPX: Protonix, holding chemo-ppx, SCDs   -Activity: ambulate as tolerated  -Disposition: MED/SURG Floor  -Additional Consultations: Advanced GI 36y female with recent Laparoscopic cholecystectomy for recurrent symptomatic cholelithiasis. Presents on POD #8 with obstructive jaundice, suggestive of Choledocholithiasis    Recommendations are as follows:    -Admit to Surgery, admitting attending Dr. Santamaria  -Pain Control: Multimodal with Gabapentin 300mg TID, Robaxin 750mg QID, Oxycodone 5mg q6hrs PRN moderate pain (holding Tylenol in setting of Transaminitis)  -CVS: EKG  -Pulmonary: Incentive Spirometry q1 hr while awake  -FEN:  IVF-   LR @ 125  mL/hr, Monitor Strict Intake/Output [id number], Replete Electrolytes PRN, Diet- NPO for possible ERCP  -ABX: ZOSYN  -PPX: Protonix, holding chemo-ppx, SCDs   -Activity: ambulate as tolerated  -Disposition: MED/SURG Floor  -Additional Consultations: Advanced GI

## 2022-01-27 NOTE — ED PROVIDER NOTE - ATTENDING CONTRIBUTION TO CARE
35 yo f with pmh of anemia, htn, lap bernie with dr. ravi last week, sent by dr. ravi for evaluation.  pt says started having upper abd pain and chills since last night.  noted that she is jaundiced.  called dr. ravi who told her to go to ED.  +nausea, but no vomiting.  decreased po intake.  has not had a bm in 11 days but passing gas.  pt had not been evaluated by gi.  exam: nad, ncat, perrl, mild scleral icterus, eomi, mmm, rrr, ctab, abd soft, mildly ttp diffusely, nd, lap sites c/d/i, aox3, no calf tenderness, no pitting edema imp: pt with recent lap bernie, with chills, abd pain and jaundice, will check labs, bili, us ruq and surg consult

## 2022-01-27 NOTE — H&P ADULT - NSHPLABSRESULTS_GEN_ALL_CORE
12.6   7.08  )-----------( 224      ( 27 Jan 2022 13:51 )             37.9     01-27    134<L>  |  99  |  11  ----------------------------<  88  4.1   |  18  |  0.7    Ca    9.7      27 Jan 2022 13:51    TPro  7.4  /  Alb  4.9  /  TBili  4.9<H>  /  DBili  4.1<H>  /  AST  429<H>  /  ALT  1050<H>  /  AlkPhos  444<H>  01-27

## 2022-01-27 NOTE — CONSULT NOTE ADULT - ATTENDING COMMENTS
Patient seen and examined, case discussed in the GI–advanced endoscopy rounds, assessment and plan above, patient seen in the ER 1-27-22 appears to be jaundiced with increased LFTs and abdominal pain,  status post Lap cholecystectomy likely has common bile duct obstruction from a retained stones which is the most common cause; plan for ERCP in a.m. after adequate resuscitation, keep n.p.o. with IV fluid and IV antibiotic, we will follow closely with you

## 2022-01-27 NOTE — H&P ADULT - ATTENDING COMMENTS
patient seen, agree with above. she noted jaundice, also had chills and sweats. elevated LFTs. will need ERCP. ? CBD stone. I had a detailed discussion with the patient, she understood.

## 2022-01-27 NOTE — H&P ADULT - NSHPPHYSICALEXAM_GEN_ALL_CORE
General: Awake, alert, Oriented to person/place/time. No acute distress  Neuro: CN II-XII grossly intact, no focal deficits  HEENT: EOMI, +cleral icterus. Trachea midline  Lung: Non-labored on room air, no wheezes/rhonchi  Cardiovascular: RRR by radial pulse, normotensive. No Jugular venous distension  Abdomen: Soft, non-tender, non-distended. No masses or organomegaly. Incisions clean/dry/intact  Extremities: no clubbing/cyanosis/edema  Skin: warm and well perfused. Capillary refill < 2 seconds  Psych: appropriate mood and affect

## 2022-01-27 NOTE — ED PROVIDER NOTE - OBJECTIVE STATEMENT
PT is a 36F with PMH of anemia, HTN, cholecystectomy 8d ago (1/19, [facility]) p/w 1d of chills, night sweats, nausea, generalized weakness that occurred yesterday and resolved. PT spoke to Dr. Santamaria this morning who advised PT to come in for evaluation. PT has not had a BM since prior to her surgery, 11d ago, but has been passing flatus. Has been tolerating PO, denies nausea aside from isolated incident yesterday, denies vomiting. Denies cough, SOB, urinary symptoms. Endorses epigastric discomfort that resembles indigestion.

## 2022-01-27 NOTE — H&P ADULT - HISTORY OF PRESENT ILLNESS
36y female who was admitted for recurrent biliary colic on 1/17/2022, and underwent laparoscopic cholecystectomy on 01/19/2022. Since the procedure she states that she has been experiencing weakness, chills, and progressive jaundice and has not had BM. Denies fevers/lightheadedness or abdominal pain currently, although states yesterday she noticed abdominal discomfort. Labs in ED suggestive of obstructive Jaundice with Ultrasound showing CBD dilatation.

## 2022-01-27 NOTE — CONSULT NOTE ADULT - SUBJECTIVE AND OBJECTIVE BOX
Patient is a 35 y/o female with PMHx of HTN, Anemia , recent pregnancy who si s/p recent CCY done on admission 2022 who presents to [facility] with chills and jaundice. She notes since surgery she has been doing relatively well other than constipation. She than noted that she started to see her eyes slowly becoming yellow. Over the past 24 hours she felt the chills on and off and had nausea. She notes no significant pains in the abdominal area other than what she feels to be post surgical soreness. She had an apple to eat and some fluids today.       PAST MEDICAL & SURGICAL HISTORY:  Essential hypertension  Anemia  H/O:   Cholelithiasis with recent CCY    Family Hx:  Father: Non Contributory   Mother: Non Contributor      Social History  Denies Current Tobacco use  Denies Current ETOH use  Denies Current Illicit Drug use       Allergies  No Known Allergies      Review of Systems  General:  See HPI  HEENT: Denies Trouble Swallowing ,Denies  Sore Throat , Denies Change in hearing/vision/speech ,Denies Dizziness    Cardio: Denies  Chest Pain , Palpitations    Respiratory: Denies worsening of SOB, Denies Cough  Abdomen: See detailed HPI  Neuro: Denies Headache Denies Dizziness, Denies Paresthesias  MSK: Denies pain in Bones/Joints/Muscles   Psych: Patient denies depression, denies suicidal or homicidal ideations  Integ: See HPI      Vital Signs   T(F): 98.1 (2022 15:17), Max: 98.4 (2022 12:58)  HR: 66 (2022 15:17) (66 - 74)  BP: 157/100 (2022 15:17) (146/0 - 157/100)  RR: 18 (2022 15:17) (18 - 18)  SpO2: 100% (2022 15:17) (100% - 100%)  Physical Exam  Gen: NAD  HEENT: NC/AT, Mucosal Membranes Moist, Icteric sclera   Cardio: S1/S2 No S3/S4, Regular  Resp: CTA B/L  Abdomen: Soft, ND/NT  Neuro: AAOx3, Cranial Nerve II-XII intact   Extremities: FROM x 4  Skin:  jaundice noted         Labs:                        12.6   7.08  )-----------( 224      ( 2022 13:51 )             37.9         134<L>  |  99  |  11  ----------------------------<  88  4.1   |  18  |  0.7    Ca    9.7      2022 13:51    TPro  7.4  /  Alb  4.9  /  TBili  4.9<H>  /  DBili  4.1<H>  /  AST  429<H>  /  ALT  1050<H>  /  AlkPhos  444<H>        
SICU Consultation Note  =====================================================  HPI:  35 y/o female with PMHx of HTN, who was recently admitted for recurrent biliary colic on 2022, and underwent laparoscopic cholecystectomy on 2022, pt was discharged home on 22.  Pt returns to the ED today with c/o weakness, chills, and progressive jaundice x 2-3 days.  Pt also states that she has not had BM x 11 days.  Denies fevers/lightheadedness or abdominal pain currently, although states yesterday she noticed abdominal discomfort.   Labs in ED suggestive of obstructive Jaundice with Ultrasound showing CBD dilatation of 0.6mm.  SICU/SDU called for close hemodynamic monitoring and suspected cholangitis.            PAST MEDICAL & SURGICAL HISTORY:  Essential hypertension    Anemia    H/O:  3/2021    S/P cholecystectomy 22      Allergies  No Known Allergies    SH: non-smoker    Home Medications:  iron sulfate:  (2022 05:17)  labetalol 200 mg oral tablet: 1 tab(s) orally 2 times a day (2022 05:17)    Advanced Directives: Full Code     ROS:  [x] A ten-point review of systems was otherwise negative except as noted.  [ ] Due to altered mental status/intubation, subjective information were not able to be obtained from the patient. History was obtained, to the extent possible, from review of the chart and collateral sources of information.      CURRENT MEDICATIONS:   --------------------------------------------------------------------------------------  Neurologic Medications  gabapentin 300 milliGRAM(s) Oral three times a day  methocarbamol 750 milliGRAM(s) Oral four times a day  oxyCODONE    IR 5 milliGRAM(s) Oral every 6 hours PRN Moderate Pain (4 - 6)    Respiratory Medications    Cardiovascular Medications  labetalol 200 milliGRAM(s) Oral two times a day    Gastrointestinal Medications  lactated ringers. 1000 milliLiter(s) IV Continuous <Continuous>  pantoprazole  Injectable 40 milliGRAM(s) IV Push daily  sodium chloride 0.9%. 1000 milliLiter(s) IV Continuous <Continuous>    Genitourinary Medications    Hematologic/Oncologic Medications    Antimicrobial/Immunologic Medications  piperacillin/tazobactam IVPB.. 3.375 Gram(s) IV Intermittent every 8 hours    Endocrine/Metabolic Medications    Topical/Other Medications  chlorhexidine 4% Liquid 1 Application(s) Topical <User Schedule>    --------------------------------------------------------------------------------------    Vital Signs Last 24 Hrs  T(C): 36.7 (2022 15:17), Max: 36.9 (2022 12:58)  T(F): 98.1 (2022 15:17), Max: 98.4 (2022 12:58)  HR: 66 (2022 15:17) (66 - 74)  BP: 157/100 (2022 15:17) (146/0 - 157/100)  BP(mean): --  RR: 18 (2022 15:17) (18 - 18)  SpO2: 100% (2022 15:17) (100% - 100%)      LABS:                        12.6   7.08  )-----------( 224      ( 2022 13:51 )             37.9         134<L>  |  99  |  11  ----------------------------<  88  4.1   |  18  |  0.7    Ca    9.7      2022 13:51    TPro  7.4  /  Alb  4.9  /  TBili  4.9<H>  /  DBili  4.1<H>  /  AST  429<H>  /  ALT  1050<H>  /  AlkPhos  444<H>  01-27    LIVER FUNCTIONS - ( 2022 13:51 )  Alb: 4.9 g/dL / Pro: 7.4 g/dL / ALK PHOS: 444 U/L / ALT: 1050 U/L / AST: 429 U/L / GGT: x           PT/INR - ( 2022 13:51 )   PT: 12.80 sec;   INR: 1.11 ratio    PTT - ( 2022 13:51 )  PTT:40.9 sec        EXAM:  General/Neuro  GCS: 15  Exam: Normal, NAD, alert, oriented x 3, no focal deficits. PERRLA, EOMI, CASTRO    Respiratory  Exam: Lungs clear to auscultation, Normal expansion/effort.       Cardiovascular  Exam: S1, S2.  Regular rate and rhythm.   Cardiac Rhythm: Normal Sinus Rhythm    GI  Exam: Abdomen soft, Non-tender, Non-distended.    Wound: healing lap port sites    Extremities  Exam: Extremities warm, well-perfused.  No Peripheral edema b/l LE     Derm:  Exam: Good skin turgor, no skin breakdown.      :   Exam: No Bee catheter.     Tubes/Lines/Drains  ***  [x] Peripheral IV

## 2022-01-27 NOTE — ED PROVIDER NOTE - NS ED ROS FT
CONSTITUTIONAL - No acute distress , No weight change  SKIN - No rash  HEMATOLOGIC - No abnormal bleeding or bruising  EYES - , No conjunctival injection, No drainage   ENT -, No sore throat, No neck pain, No rhinorrhea, No ear pain  RESPIRATORY - No shortness of breath, No cough  CARDIAC -No chest pain, No palpitations  GI - + abdominal discomfort, + nausea, No vomiting, No diarrhea, + constipation, No bright red blood per rectum or melena. No flank pain  - No dysuria, frequency, hematuria  MUSCULOSKELETAL - No joint paint, No swelling, No back pain  NEUROLOGIC - No numbness, No focal weakness, No headache, No dizziness  ALLERGIC- no pruritis

## 2022-01-27 NOTE — ED PROVIDER NOTE - CLINICAL SUMMARY MEDICAL DECISION MAKING FREE TEXT BOX
Pt with recent bernie, with abd pain and jaundice, found to have elevated lfts, surgery evaluated pt and will admit to their service with gi consult

## 2022-01-28 ENCOUNTER — TRANSCRIPTION ENCOUNTER (OUTPATIENT)
Age: 37
End: 2022-01-28

## 2022-01-28 LAB
A1C WITH ESTIMATED AVERAGE GLUCOSE RESULT: 5.1 % — SIGNIFICANT CHANGE UP (ref 4–5.6)
ALBUMIN SERPL ELPH-MCNC: 4 G/DL — SIGNIFICANT CHANGE UP (ref 3.5–5.2)
ALP SERPL-CCNC: 393 U/L — HIGH (ref 30–115)
ALT FLD-CCNC: 900 U/L — HIGH (ref 0–41)
ANION GAP SERPL CALC-SCNC: 14 MMOL/L — SIGNIFICANT CHANGE UP (ref 7–14)
APTT BLD: 40.4 SEC — HIGH (ref 27–39.2)
AST SERPL-CCNC: 348 U/L — HIGH (ref 0–41)
BASOPHILS # BLD AUTO: 0.04 K/UL — SIGNIFICANT CHANGE UP (ref 0–0.2)
BASOPHILS NFR BLD AUTO: 0.6 % — SIGNIFICANT CHANGE UP (ref 0–1)
BILIRUB DIRECT SERPL-MCNC: 4.1 MG/DL — HIGH (ref 0–0.3)
BILIRUB INDIRECT FLD-MCNC: 0.9 MG/DL — SIGNIFICANT CHANGE UP (ref 0.2–1.2)
BILIRUB SERPL-MCNC: 5 MG/DL — HIGH (ref 0.2–1.2)
BUN SERPL-MCNC: 8 MG/DL — LOW (ref 10–20)
CALCIUM SERPL-MCNC: 9.1 MG/DL — SIGNIFICANT CHANGE UP (ref 8.5–10.1)
CHLORIDE SERPL-SCNC: 99 MMOL/L — SIGNIFICANT CHANGE UP (ref 98–110)
CO2 SERPL-SCNC: 21 MMOL/L — SIGNIFICANT CHANGE UP (ref 17–32)
CREAT SERPL-MCNC: 0.7 MG/DL — SIGNIFICANT CHANGE UP (ref 0.7–1.5)
EOSINOPHIL # BLD AUTO: 0.2 K/UL — SIGNIFICANT CHANGE UP (ref 0–0.7)
EOSINOPHIL NFR BLD AUTO: 3.2 % — SIGNIFICANT CHANGE UP (ref 0–8)
ESTIMATED AVERAGE GLUCOSE: 100 MG/DL — SIGNIFICANT CHANGE UP (ref 68–114)
GLUCOSE SERPL-MCNC: 104 MG/DL — HIGH (ref 70–99)
HCT VFR BLD CALC: 35.7 % — LOW (ref 37–47)
HGB BLD-MCNC: 11.6 G/DL — LOW (ref 12–16)
IMM GRANULOCYTES NFR BLD AUTO: 0.3 % — SIGNIFICANT CHANGE UP (ref 0.1–0.3)
INR BLD: 1.11 RATIO — SIGNIFICANT CHANGE UP (ref 0.65–1.3)
LYMPHOCYTES # BLD AUTO: 1.06 K/UL — LOW (ref 1.2–3.4)
LYMPHOCYTES # BLD AUTO: 16.8 % — LOW (ref 20.5–51.1)
MAGNESIUM SERPL-MCNC: 1.9 MG/DL — SIGNIFICANT CHANGE UP (ref 1.8–2.4)
MCHC RBC-ENTMCNC: 29.6 PG — SIGNIFICANT CHANGE UP (ref 27–31)
MCHC RBC-ENTMCNC: 32.5 G/DL — SIGNIFICANT CHANGE UP (ref 32–37)
MCV RBC AUTO: 91.1 FL — SIGNIFICANT CHANGE UP (ref 81–99)
MONOCYTES # BLD AUTO: 0.5 K/UL — SIGNIFICANT CHANGE UP (ref 0.1–0.6)
MONOCYTES NFR BLD AUTO: 7.9 % — SIGNIFICANT CHANGE UP (ref 1.7–9.3)
NEUTROPHILS # BLD AUTO: 4.48 K/UL — SIGNIFICANT CHANGE UP (ref 1.4–6.5)
NEUTROPHILS NFR BLD AUTO: 71.2 % — SIGNIFICANT CHANGE UP (ref 42.2–75.2)
NRBC # BLD: 0 /100 WBCS — SIGNIFICANT CHANGE UP (ref 0–0)
PHOSPHATE SERPL-MCNC: 3.1 MG/DL — SIGNIFICANT CHANGE UP (ref 2.1–4.9)
PLATELET # BLD AUTO: 206 K/UL — SIGNIFICANT CHANGE UP (ref 130–400)
POTASSIUM SERPL-MCNC: 4 MMOL/L — SIGNIFICANT CHANGE UP (ref 3.5–5)
POTASSIUM SERPL-SCNC: 4 MMOL/L — SIGNIFICANT CHANGE UP (ref 3.5–5)
PROT SERPL-MCNC: 6.4 G/DL — SIGNIFICANT CHANGE UP (ref 6–8)
PROTHROM AB SERPL-ACNC: 12.7 SEC — SIGNIFICANT CHANGE UP (ref 9.95–12.87)
RBC # BLD: 3.92 M/UL — LOW (ref 4.2–5.4)
RBC # FLD: 13.3 % — SIGNIFICANT CHANGE UP (ref 11.5–14.5)
SODIUM SERPL-SCNC: 134 MMOL/L — LOW (ref 135–146)
WBC # BLD: 6.3 K/UL — SIGNIFICANT CHANGE UP (ref 4.8–10.8)
WBC # FLD AUTO: 6.3 K/UL — SIGNIFICANT CHANGE UP (ref 4.8–10.8)

## 2022-01-28 PROCEDURE — 43274 ERCP DUCT STENT PLACEMENT: CPT

## 2022-01-28 PROCEDURE — 99232 SBSQ HOSP IP/OBS MODERATE 35: CPT | Mod: 24

## 2022-01-28 PROCEDURE — 43262 ENDO CHOLANGIOPANCREATOGRAPH: CPT | Mod: XU

## 2022-01-28 PROCEDURE — 74328 X-RAY BILE DUCT ENDOSCOPY: CPT | Mod: 26

## 2022-01-28 PROCEDURE — 43264 ERCP REMOVE DUCT CALCULI: CPT | Mod: XU

## 2022-01-28 PROCEDURE — 99291 CRITICAL CARE FIRST HOUR: CPT | Mod: 25,24

## 2022-01-28 RX ORDER — LANOLIN ALCOHOL/MO/W.PET/CERES
5 CREAM (GRAM) TOPICAL AT BEDTIME
Refills: 0 | Status: DISCONTINUED | OUTPATIENT
Start: 2022-01-28 | End: 2022-01-30

## 2022-01-28 RX ORDER — INDOMETHACIN 50 MG
100 CAPSULE ORAL ONCE
Refills: 0 | Status: COMPLETED | OUTPATIENT
Start: 2022-01-28 | End: 2022-01-28

## 2022-01-28 RX ADMIN — SODIUM CHLORIDE 125 MILLILITER(S): 9 INJECTION, SOLUTION INTRAVENOUS at 00:08

## 2022-01-28 RX ADMIN — PIPERACILLIN AND TAZOBACTAM 25 GRAM(S): 4; .5 INJECTION, POWDER, LYOPHILIZED, FOR SOLUTION INTRAVENOUS at 00:41

## 2022-01-28 RX ADMIN — Medication 5 MILLIGRAM(S): at 02:39

## 2022-01-28 RX ADMIN — PIPERACILLIN AND TAZOBACTAM 25 GRAM(S): 4; .5 INJECTION, POWDER, LYOPHILIZED, FOR SOLUTION INTRAVENOUS at 13:12

## 2022-01-28 RX ADMIN — PANTOPRAZOLE SODIUM 40 MILLIGRAM(S): 20 TABLET, DELAYED RELEASE ORAL at 12:36

## 2022-01-28 RX ADMIN — SENNA PLUS 2 TABLET(S): 8.6 TABLET ORAL at 22:16

## 2022-01-28 RX ADMIN — Medication 200 MILLIGRAM(S): at 18:28

## 2022-01-28 RX ADMIN — PIPERACILLIN AND TAZOBACTAM 25 GRAM(S): 4; .5 INJECTION, POWDER, LYOPHILIZED, FOR SOLUTION INTRAVENOUS at 22:21

## 2022-01-28 RX ADMIN — Medication 5 MILLIGRAM(S): at 22:16

## 2022-01-28 RX ADMIN — PIPERACILLIN AND TAZOBACTAM 25 GRAM(S): 4; .5 INJECTION, POWDER, LYOPHILIZED, FOR SOLUTION INTRAVENOUS at 05:37

## 2022-01-28 RX ADMIN — CHLORHEXIDINE GLUCONATE 1 APPLICATION(S): 213 SOLUTION TOPICAL at 05:37

## 2022-01-28 RX ADMIN — Medication 200 MILLIGRAM(S): at 07:09

## 2022-01-28 RX ADMIN — Medication 100 MILLIGRAM(S): at 17:20

## 2022-01-28 NOTE — PROGRESS NOTE ADULT - SUBJECTIVE AND OBJECTIVE BOX
EVELINA READ  [id number]  36y Female    Indication for ICU admission: obstructive jaundice with suspcted cholangitis, s/p lap ricki 22    Admit Date:22  ICU Date: 22  OR Date:    No Known Allergies    PAST MEDICAL & SURGICAL HISTORY:  Essential hypertension    Anemia    Asthma    H/O:     S/P cholecystectomy      Home Medications:  iron sulfate:  (2022 05:17)  labetalol 200 mg oral tablet: 1 tab(s) orally 2 times a day (2022 05:17)    24HRS EVENT:    Night  - heparin ppx d/c'd for AM ERCP  - no abdominal pain    DVT PTX: Heparin sq    GI PTX:pantoprazole  Injectable 40 milliGRAM(s) IV Push daily      ***Tubes/Lines/Drains  ***  Peripheral IV      REVIEW OF SYSTEMS    [x ] A ten-point review of systems was otherwise negative except as noted.  [ ] Due to altered mental status/intubation, subjective information were not able to be obtained from the patient. History was obtained, to the extent possible, from review of the chart and collateral sources of information.           EVELINA READ  [id number]  36y Female    Indication for ICU admission: obstructive jaundice with suspcted cholangitis, s/p lap ricki 22    Admit Date:22  ICU Date: 22  OR Date: N/A    Allergies:  No Known Allergies    PAST MEDICAL & SURGICAL HISTORY:  Essential hypertension  Anemia  Asthma  H/O:   S/P cholecystectomy      Home Medications:  iron sulfate:  (2022 05:17)  labetalol 200 mg oral tablet: 1 tab(s) orally 2 times a day (2022 05:17)      24HRS EVENT:    Night  - heparin ppx d/c'd for AM ERCP  - NPO for procedure  - no abdominal pain    DVT PTX: HSQ, last dose midnight, held for procedure    GI PTX:pantoprazole  Injectable 40 milliGRAM(s) IV Push daily      ***Tubes/Lines/Drains  ***  Peripheral IV      REVIEW OF SYSTEMS    [x ] A ten-point review of systems was otherwise negative except as noted.  [ ] Due to altered mental status/intubation, subjective information were not able to be obtained from the patient. History was obtained, to the extent possible, from review of the chart and collateral sources of information.      Daily        CURRENT MEDS:  Neurologic Medications  gabapentin 300 milliGRAM(s) Oral three times a day  melatonin 5 milliGRAM(s) Oral at bedtime  methocarbamol 750 milliGRAM(s) Oral four times a day  oxyCODONE    IR 5 milliGRAM(s) Oral every 6 hours PRN Moderate Pain (4 - 6)    Cardiovascular Medications  labetalol 200 milliGRAM(s) Oral two times a day    Gastrointestinal Medications  lactated ringers. 1000 milliLiter(s) IV Continuous <Continuous>  pantoprazole  Injectable 40 milliGRAM(s) IV Push daily  polyethylene glycol 3350 17 Gram(s) Oral daily  senna 2 Tablet(s) Oral at bedtime    Hematologic/Oncologic Medications  influenza   Vaccine 0.5 milliLiter(s) IntraMuscular once    Antimicrobial/Immunologic Medications  piperacillin/tazobactam IVPB.. 3.375 Gram(s) IV Intermittent every 8 hours    Topical/Other Medications  chlorhexidine 4% Liquid 1 Application(s) Topical <User Schedule>      ICU Vital Signs Last 24 Hrs  T(C): 36.8 (2022 12:00), Max: 37.2 (2022 04:01)  T(F): 98.2 (2022 12:00), Max: 98.9 (2022 04:01)  HR: 66 (2022 13:00) (58 - 75)  BP: 151/80 (2022 13:00) (120/78 - 181/96)  BP(mean): 108 (2022 13:) (90 - 116)  ABP: --  ABP(mean): --  RR: 18 (2022 13:) (18 - 18)  SpO2: 99% (2022 13:) (98% - 100%)          I&O's Summary    2022 07:01  -  2022 07:00  --------------------------------------------------------  IN: 125 mL / OUT: 0 mL / NET: 125 mL    2022 07:01  -  2022 14:55  --------------------------------------------------------  IN: 1100 mL / OUT: 0 mL / NET: 1100 mL      I&O's Detail    2022 07:01  -  2022 07:00  --------------------------------------------------------  IN:    Lactated Ringers: 125 mL  Total IN: 125 mL    OUT:  Total OUT: 0 mL    Total NET: 125 mL      2022 07:01  -  2022 14:55  --------------------------------------------------------  IN:    IV PiggyBack: 100 mL    Lactated Ringers: 1000 mL  Total IN: 1100 mL    OUT:  Total OUT: 0 mL    Total NET: 1100 mL        PHYSICAL EXAM:     a&ox3, follows commands, CASTRO  no acute distress  equal chest rise b/l, CTAB  RRR; S1, S2 heard. No m/r/g  abdomen soft, non-tender  extremities soft        LABS AND IMAGIN.6   6.30  )-----------( 206      ( 2022 07:30 )             35.7       134<L>  |  99  |  8<L>  ----------------------------<  104<H>  4.0   |  21  |  0.7    Ca    9.1      2022 07:30  Phos  3.1       Mg     1.9         TPro  6.4  /  Alb  4.0  /  TBili  5.0<H>  /  DBili  4.1<H>  /  AST  348<H>  /  ALT  900<H>  /  AlkPhos  393<H>      PT/INR - ( 2022 07:30 )   PT: 12.70 sec;   INR: 1.11 ratio      PTT - ( 2022 07:30 )  PTT:40.4 sec EVELINA READ  [id number]  36y Female    Indication for ICU admission: obstructive jaundice with suspcted cholangitis, s/p lap ricki 22    Admit Date:22  ICU Date: 22  OR Date: N/A    Allergies:  No Known Allergies    PAST MEDICAL & SURGICAL HISTORY:  Essential hypertension  Anemia  Asthma  H/O:   S/P cholecystectomy      Home Medications:  iron sulfate:  (2022 05:17)  labetalol 200 mg oral tablet: 1 tab(s) orally 2 times a day (2022 05:17)      24HRS EVENT:    Night  - heparin ppx d/c'd for AM ERCP  - NPO for procedure  - no abdominal pain    DVT PTX: HSQ, last dose midnight, held for procedure    GI PTX:pantoprazole  Injectable 40 milliGRAM(s) IV Push daily      ***Tubes/Lines/Drains  ***  Peripheral IV      REVIEW OF SYSTEMS    [x ] A ten-point review of systems was otherwise negative except as noted.  [ ] Due to altered mental status/intubation, subjective information were not able to be obtained from the patient. History was obtained, to the extent possible, from review of the chart and collateral sources of information.      Daily        CURRENT MEDS:  Neurologic Medications  gabapentin 300 milliGRAM(s) Oral three times a day  melatonin 5 milliGRAM(s) Oral at bedtime  methocarbamol 750 milliGRAM(s) Oral four times a day  oxyCODONE    IR 5 milliGRAM(s) Oral every 6 hours PRN Moderate Pain (4 - 6)    Cardiovascular Medications  labetalol 200 milliGRAM(s) Oral two times a day    Gastrointestinal Medications  lactated ringers. 1000 milliLiter(s) IV Continuous <Continuous>  pantoprazole  Injectable 40 milliGRAM(s) IV Push daily  polyethylene glycol 3350 17 Gram(s) Oral daily  senna 2 Tablet(s) Oral at bedtime    Hematologic/Oncologic Medications  influenza   Vaccine 0.5 milliLiter(s) IntraMuscular once    Antimicrobial/Immunologic Medications  piperacillin/tazobactam IVPB.. 3.375 Gram(s) IV Intermittent every 8 hours    Topical/Other Medications  chlorhexidine 4% Liquid 1 Application(s) Topical <User Schedule>      ICU Vital Signs Last 24 Hrs  T(C): 36.8 (2022 12:00), Max: 37.2 (2022 04:01)  T(F): 98.2 (2022 12:00), Max: 98.9 (2022 04:01)  HR: 66 (2022 13:00) (58 - 75)  BP: 151/80 (2022 13:00) (120/78 - 181/96)  BP(mean): 108 (2022 13:) (90 - 116)  ABP: --  ABP(mean): --  RR: 18 (2022 13:) (18 - 18)  SpO2: 99% (2022 13:) (98% - 100%)          I&O's Summary    2022 07:01  -  2022 07:00  --------------------------------------------------------  IN: 125 mL / OUT: 0 mL / NET: 125 mL    2022 07:01  -  2022 14:55  --------------------------------------------------------  IN: 1100 mL / OUT: 0 mL / NET: 1100 mL      I&O's Detail    2022 07:01  -  2022 07:00  --------------------------------------------------------  IN:    Lactated Ringers: 125 mL  Total IN: 125 mL    OUT:  Total OUT: 0 mL    Total NET: 125 mL      2022 07:01  -  2022 14:55  --------------------------------------------------------  IN:    IV PiggyBack: 100 mL    Lactated Ringers: 1000 mL  Total IN: 1100 mL    OUT:  Total OUT: 0 mL    Total NET: 1100 mL        PHYSICAL EXAM:     a&ox3, follows commands, CASTRO  no acute distress  equal chest rise b/l, CTAB  RRR; S1, S2 heard. No m/r/g  abdomen soft, non-tender  extremities soft        LABS AND IMAGIN.6   6.30  )-----------( 206      ( 2022 07:30 )             35.7       134<L>  |  99  |  8<L>  ----------------------------<  104<H>  4.0   |  21  |  0.7    Ca    9.1      2022 07:30  Phos  3.1       Mg     1.9         TPro  6.4  /  Alb  4.0  /  TBili  5.0<H>  /  DBili  4.1<H>  /  AST  348<H>  /  ALT  900<H>  /  AlkPhos  393<H>      PT/INR - ( 2022 07:30 )   PT: 12.70 sec;   INR: 1.11 ratio      PTT - ( 2022 07:30 )  PTT:40.4 sec

## 2022-01-28 NOTE — PROGRESS NOTE ADULT - ASSESSMENT
ASSESSMENT:  35 y/o female with PMHx of HTN, who was recently admitted for recurrent biliary colic on 1/17/2022, and underwent laparoscopic cholecystectomy on 01/19/2022 and discharged home on 1/20/22 returning with obstructive Jaundice and Ultrasound showing CBD dilatation of 0.6mm.     PLAN:  -Pain Control: Multimodal with Gabapentin 300mg TID, Robaxin 750mg QID, Oxycodone 5mg q6hrs PRN moderate pain (holding Tylenol in setting of Transaminitis)  -CVS: EKG  -Pulmonary: Incentive Spirometry q1 hr while awake  -FEN:  IVF-   LR @ 125  mL/hr, Monitor Strict Intake/Output [id number], Replete Electrolytes PRN, Diet- NPO for possible ERCP  -ABX: ZOSYN  -PPX: Protonix, holding chemo-ppx, SCDs   -Activity: ambulate as tolerated  -Additional Consultations: Advanced GI    LINES: Peripheral IV    TRAUMA SPECTRA: [id number]

## 2022-01-28 NOTE — PROGRESS NOTE ADULT - ASSESSMENT
Assessment & Plan  37 y/o female with PMHx of HTN, who was recently admitted for recurrent biliary colic on 1/17/2022, and underwent laparoscopic cholecystectomy on 01/19/2022, pt was discharged home on 1/20/22.  Pt returns to the ED today with c/o weakness, chills, and progressive jaundice x 2-3 days.  Pt also states that she has not had BM x 11 days.  Labs in ED suggestive of obstructive Jaundice with US showing CBD dilatation of 0.6 mm.     NEURO:    Acute pain-controlled with tylenol, gabapentin, robaxin, and oxy prn     RESP:   -satting well on RA  -encourage IS  -f/up CXR      CARDS:   # HTN  -continue labetalol 200 two times a day  -keep normotensive    GI/NUTR:   #s/p lap bernie 1/19/22 with obstrcutive jaundice now  -plan for ERCP with GI for tomorrow 1/28    -keep NPO    GI Prophylaxis- PPI    Bowel regimen- senna, miralax    /RENAL:   Monitor UO- no bradley in place  IVF: LR @ 125/hr     Labs:          BUN/Cr- 11/0.7  -->          Electrolytes-Na 134 // K 4.1 // Mg -- //  Phos -- (01-27 @ 13:51)    HEME/ONC:       DVT prophylaxis-Heparin sq(on hold for ERCP), SCDs    Labs: Hb/Hct:  12.6/37.9  -->                      Plts:  224  -->                 PTT/INR:  40.9/1.11  --->     ID:  WBC- 7.08  --->>  Temp trend- 24hrs T(F): 98.1 (01-27 @ 15:17), Max: 98.4 (01-27 @ 12:58)  Antibiotics-piperacillin/tazobactam IVPB.. 3.375 every 8 hours  -monitor for signs of infection       ENDO:     Glucose, Serum: 88 (01-27 @ 13:51)  -HA1C in am    LINES/DRAINS:  PIV    DISPO:    SDU           Assessment & Plan  37 y/o female with PMHx of HTN, who was recently admitted for recurrent biliary colic on 1/17/2022, and underwent laparoscopic cholecystectomy on 01/19/2022, pt was discharged home on 1/20/22.  Pt returns to the ED today with c/o weakness, chills, and progressive jaundice x 2-3 days.  Pt also states that she has not had BM x 11 days.  Labs in ED suggestive of obstructive Jaundice with US showing CBD dilatation of 0.6 mm.     NEURO:    Acute pain-controlled with tylenol, gabapentin, robaxin, and oxy prn       RESP:   - Saturating well on RA  - Encourage IS  - CXR no acute findings        CARDS:   # HTN  -continue labetalol 200 two times a day  -keep normotensive      GI/NUTR:   #obstructive jaundice s/p lap bernie 1/19/22  - plan for ERCP with GI today  - keep NPO, HSQ held after midnight  - Transaminitis improving: AST/ALT  429/1050 --> 348/900    Diet: NPO for procedure    GI Prophylaxis- PPI    Bowel regimen- senna, miralax      /RENAL:   Monitor UO- voiding freely  IVF: LR @ 125/hr     Labs:          BUN/Cr- 11/0.7  -->   8/0.7          Electrolytes-Na 134 // K 4.0 // Mg 3.1 //  Phos 1.9      HEME/ONC:       DVT prophylaxis - HSQ (on hold for ERCP), SCDs    Labs: Hb/Hct:  12.6/37.9  -->   11.6/35.7                   Plts:  224  -->   206              PTT/INR:  40.9/1.11  --->   40.4/1.11      ID:  WBC- 7.08  --->>   6.3  Temp trend- 97.5F - 98.9F; Tmax: 98.9F  Antibiotics - piperacillin/tazobactam IVPB 3.375 every 8 hours  - monitor for signs of infection         ENDO:     Glucose, Serum: 104  -HA1C - 5.1%    LINES/DRAINS:  PIV    DISPO:    SDU Assessment & Plan  37 y/o female with PMHx of HTN, who was recently admitted for recurrent biliary colic on 1/17/2022, and underwent laparoscopic cholecystectomy on 01/19/2022, pt was discharged home on 1/20/22.  Pt returns to the ED today with c/o weakness, chills, and progressive jaundice x 2-3 days.  Pt also states that she has not had BM x 11 days.  Labs in ED suggestive of obstructive Jaundice with US showing CBD dilatation of 0.6 mm.     NEURO:    Acute pain-controlled with tylenol, gabapentin, robaxin, and oxy prn       RESP:   - Saturating well on RA  - Encourage IS  - CXR no acute findings        CARDS:   # HTN  -continue labetalol 200 two times a day  -keep normotensive      GI/NUTR:   #obstructive jaundice s/p lap bernie 1/19/22  - ERCP with GI 1/28/22. Patient had Sphincterotomy with removal of multiple CBD stones. Patient had placement of plastic CBD and PD stent. Will need ERCP in 4-6 weeks to remove stents.  - keep NPO, HSQ held after midnight  - Transaminitis improving: AST/ALT  429/1050 --> 348/900 -->    Diet: NPO for procedure    GI Prophylaxis- PPI    Bowel regimen- senna, miralax      /RENAL:   Monitor UO- voiding freely  IVF: LR @ 125/hr     Labs:          BUN/Cr- 11/0.7  -->   8/0.7          Electrolytes-Na 134 // K 4.0 // Mg 3.1 //  Phos 1.9      HEME/ONC:       DVT prophylaxis - HSQ (on hold for ERCP), SCDs    Labs: Hb/Hct:  12.6/37.9  -->   11.6/35.7                   Plts:  224  -->   206              PTT/INR:  40.9/1.11  --->   40.4/1.11      ID:  WBC- 7.08  --->>   6.3  Temp trend- 97.5F - 98.9F; Tmax: 98.9F  Antibiotics - piperacillin/tazobactam IVPB 3.375 every 8 hours  - monitor for signs of infection         ENDO:     Glucose, Serum: 104  -HA1C - 5.1%    LINES/DRAINS:  PIV    DISPO:    SDU

## 2022-01-28 NOTE — CHART NOTE - NSCHARTNOTEFT_GEN_A_CORE
Patient s/p ERCP. Patient had Sphincterotomy with removal of multiple CBD stones. Patient had placement of plastic CBD and PD stent. Will need ERCP in 4-6 weeks to remove stents.
PACU ANESTHESIA ADMISSION NOTE      Procedure:   Post op diagnosis:      ____  Intubated  TV:______       Rate: ______      FiO2: ______    __x__  Patent Airway    __x__  Full return of protective reflexes    __x__  Full recovery from anesthesia / back to baseline     Vitals:   T:   98.2        R: 18                 BP: 150/90                 Sat: 100                  P: 87      Mental Status:  __x__ Awake   ___x__ Alert   _____ Drowsy   _____ Sedated    Nausea/Vomiting:  _x___ NO  ______Yes,   See Post - Op Orders          Pain Scale (0-10):  _____    Treatment: __x__ None    ____ See Post - Op/PCA Orders    Post - Operative Fluids:   ____ Oral   ___x_ See Post - Op Orders    Plan: Discharge:   __x__Home       _____Floor     _____Critical Care    _____  Other:_________________    Comments:    Uneventful anesthesia. Patient transported to  spontaneously breathing and hemodynamically stable.
Patient is S/P ERCP:   radiograph was taken, surgical clips seen in place. Limited views of the esophagus, stomach and duodenum were unremarkable. The major papilla was noted in the second portion of duodenum and appeared normal.  The major papilla was cannulated, using wire-guided cannulation by double wire technique, A 5 F X 3 cm pancreatic stent was placed, then using a Flowcut sphinctertome preloaded with a 0.025 visiglide 450 cm guidewire the common bile duct was cannulated, the wire was seen in the CBD, cholangiogram was performed confirming location and showed a filling defects in the CBD, the CBD was dilated but otherwise unremarkable. Sphincterotomy was performed, without bleeding. Multiple stones, sludge and pigmented debris were removed from the bile duct using a biliary, stone extraction balloon and basket. The duct was then swept multiple times and was irrigated with 40 ml of sterile water. An occlusion cholangiogram was performed opacifying the bile duct and the cystic duct. Due to persistent sludge and debris in effluent, a stent was placed. Over the guidewire in the CBD, a 7F X 7 cm straight plastic biliary stent was placed in the CBD under fluoroscopy and appeared in satisfactory position. The PD was not injected in this exam. The scope was then removed and procedure terminated. Post-procedure xray did not show air under the diaphragm.    REC:  clear liquid diet and advance as tolerated   watch for signs of bleeding or pancreatitis ( though unexpected)  follow up as outpatient   stents removal in 4 weeks

## 2022-01-28 NOTE — PROGRESS NOTE ADULT - SUBJECTIVE AND OBJECTIVE BOX
GENERAL SURGERY PROGRESS NOTE    Patient: EVELINA READ , 36y ([birth year])Female   MRN: [id number]  Location: [address] 008   Visit: 22 Inpatient  Date: 22 @ 07:43    Hospital Day #: 2  Post-Op Day #: 9    Procedure/Dx/Injuries: 35 y/o female with PMHx of HTN, who was recently admitted for recurrent biliary colic on 2022, and underwent laparoscopic cholecystectomy on 2022 and discharged home on 22 returning with obstructive Jaundice and Ultrasound showing CBD dilatation of 0.6mm.     Events of past 24 hours:  NIGHT  - heparin ppx d/c'd for AM ERCP  - no abdominal pain    PAST MEDICAL & SURGICAL HISTORY:  Essential hypertension    Anemia    H/O:     S/P cholecystectomy    Vitals:   T(F): 98.9 (22 @ 04:01), Max: 98.9 (22 @ 04:01)  HR: 73 (22 @ 03:00)  BP: 144/70 (22 @ 04:01)  RR: 18 (22 @ 23:27)  SpO2: 98% (22 @ 03:00)    Fluids: lactated ringers.: Solution, 1000 milliLiter(s) infuse at 125 mL/Hr    PHYSICAL EXAM:  General: Awake, alert, Oriented to person/place/time. No acute distress  Neuro: CN II-XII grossly intact, no focal deficits  HEENT: EOMI, +cleral icterus. Trachea midline  Lung: Non-labored on room air, no wheezes/rhonchi  Cardiovascular: RRR by radial pulse, normotensive. No Jugular venous distension  Abdomen: Soft, non-tender, non-distended. No masses or organomegaly. Incisions clean/dry/intact  Extremities: no clubbing/cyanosis/edema  Skin: warm and well perfused. Capillary refill < 2 seconds  Psych: appropriate mood and affect    MEDICATIONS  (STANDING):  chlorhexidine 4% Liquid 1 Application(s) Topical <User Schedule>  gabapentin 300 milliGRAM(s) Oral three times a day  influenza   Vaccine 0.5 milliLiter(s) IntraMuscular once  labetalol 200 milliGRAM(s) Oral two times a day  lactated ringers. 1000 milliLiter(s) (125 mL/Hr) IV Continuous <Continuous>  melatonin 5 milliGRAM(s) Oral at bedtime  methocarbamol 750 milliGRAM(s) Oral four times a day  pantoprazole  Injectable 40 milliGRAM(s) IV Push daily  piperacillin/tazobactam IVPB.. 3.375 Gram(s) IV Intermittent every 8 hours  polyethylene glycol 3350 17 Gram(s) Oral daily  senna 2 Tablet(s) Oral at bedtime    MEDICATIONS  (PRN):  oxyCODONE    IR 5 milliGRAM(s) Oral every 6 hours PRN Moderate Pain (4 - 6)      DVT PROPHYLAXIS:   GI PROPHYLAXIS: pantoprazole  Injectable 40 milliGRAM(s) IV Push daily    ANTICOAGULATION:   ANTIBIOTICS:  piperacillin/tazobactam IVPB.. 3.375 Gram(s)    LAB/STUDIES:  Labs:  CAPILLARY BLOOD GLUCOSE      POCT Blood Glucose.: 70 mg/dL (2022 21:19)                          12.6   7.08  )-----------( 224      ( 2022 13:51 )             37.9       Auto Neutrophil %: 70.8 % (22 @ 13:51)  Auto Immature Granulocyte %: 0.3 % (22 @ 13:51)        134<L>  |  99  |  11  ----------------------------<  88  4.1   |  18  |  0.7      Calcium, Total Serum: 9.7 mg/dL (22 @ 13:51)      LFTs:             7.4  | 4.9  | 429      ------------------[444     ( 2022 13:51 )  4.9  | 4.1  | 1050        Lipase:48     Amylase:x         Lactate, Blood: 0.7 mmol/L (22 @ 13:51)      Coags:     12.80  ----< 1.11    ( 2022 13:51 )     40.9      Urinalysis Basic - ( 2022 15:36 )    Color: Stephania / Appearance: Slightly Turbid / S.030 / pH: x  Gluc: x / Ketone: Moderate  / Bili: Large / Urobili: 6 mg/dL   Blood: x / Protein: 30 mg/dL / Nitrite: Negative   Leuk Esterase: Negative / RBC: 2 /HPF / WBC 2 /HPF   Sq Epi: x / Non Sq Epi: 5 /HPF / Bacteria: Negative    LINES: Peripheral IV

## 2022-01-29 LAB
ALBUMIN SERPL ELPH-MCNC: 4 G/DL — SIGNIFICANT CHANGE UP (ref 3.5–5.2)
ALP SERPL-CCNC: 402 U/L — HIGH (ref 30–115)
ALT FLD-CCNC: 880 U/L — HIGH (ref 0–41)
ANION GAP SERPL CALC-SCNC: 16 MMOL/L — HIGH (ref 7–14)
AST SERPL-CCNC: 341 U/L — HIGH (ref 0–41)
BASOPHILS # BLD AUTO: 0.02 K/UL — SIGNIFICANT CHANGE UP (ref 0–0.2)
BASOPHILS NFR BLD AUTO: 0.2 % — SIGNIFICANT CHANGE UP (ref 0–1)
BILIRUB DIRECT SERPL-MCNC: 2.6 MG/DL — HIGH (ref 0–0.3)
BILIRUB INDIRECT FLD-MCNC: 1 MG/DL — SIGNIFICANT CHANGE UP (ref 0.2–1.2)
BILIRUB SERPL-MCNC: 3.6 MG/DL — HIGH (ref 0.2–1.2)
BUN SERPL-MCNC: 9 MG/DL — LOW (ref 10–20)
CALCIUM SERPL-MCNC: 9.2 MG/DL — SIGNIFICANT CHANGE UP (ref 8.5–10.1)
CHLORIDE SERPL-SCNC: 99 MMOL/L — SIGNIFICANT CHANGE UP (ref 98–110)
CO2 SERPL-SCNC: 18 MMOL/L — SIGNIFICANT CHANGE UP (ref 17–32)
CREAT SERPL-MCNC: 0.8 MG/DL — SIGNIFICANT CHANGE UP (ref 0.7–1.5)
EOSINOPHIL # BLD AUTO: 0.03 K/UL — SIGNIFICANT CHANGE UP (ref 0–0.7)
EOSINOPHIL NFR BLD AUTO: 0.3 % — SIGNIFICANT CHANGE UP (ref 0–8)
GLUCOSE SERPL-MCNC: 110 MG/DL — HIGH (ref 70–99)
HCT VFR BLD CALC: 33.5 % — LOW (ref 37–47)
HGB BLD-MCNC: 11 G/DL — LOW (ref 12–16)
IMM GRANULOCYTES NFR BLD AUTO: 0.5 % — HIGH (ref 0.1–0.3)
LYMPHOCYTES # BLD AUTO: 0.89 K/UL — LOW (ref 1.2–3.4)
LYMPHOCYTES # BLD AUTO: 9.6 % — LOW (ref 20.5–51.1)
MAGNESIUM SERPL-MCNC: 1.8 MG/DL — SIGNIFICANT CHANGE UP (ref 1.8–2.4)
MCHC RBC-ENTMCNC: 29.6 PG — SIGNIFICANT CHANGE UP (ref 27–31)
MCHC RBC-ENTMCNC: 32.8 G/DL — SIGNIFICANT CHANGE UP (ref 32–37)
MCV RBC AUTO: 90.3 FL — SIGNIFICANT CHANGE UP (ref 81–99)
MONOCYTES # BLD AUTO: 0.39 K/UL — SIGNIFICANT CHANGE UP (ref 0.1–0.6)
MONOCYTES NFR BLD AUTO: 4.2 % — SIGNIFICANT CHANGE UP (ref 1.7–9.3)
NEUTROPHILS # BLD AUTO: 7.89 K/UL — HIGH (ref 1.4–6.5)
NEUTROPHILS NFR BLD AUTO: 85.2 % — HIGH (ref 42.2–75.2)
NRBC # BLD: 0 /100 WBCS — SIGNIFICANT CHANGE UP (ref 0–0)
PHOSPHATE SERPL-MCNC: 4 MG/DL — SIGNIFICANT CHANGE UP (ref 2.1–4.9)
PLATELET # BLD AUTO: 204 K/UL — SIGNIFICANT CHANGE UP (ref 130–400)
POTASSIUM SERPL-MCNC: 4.5 MMOL/L — SIGNIFICANT CHANGE UP (ref 3.5–5)
POTASSIUM SERPL-SCNC: 4.5 MMOL/L — SIGNIFICANT CHANGE UP (ref 3.5–5)
PROT SERPL-MCNC: 6.5 G/DL — SIGNIFICANT CHANGE UP (ref 6–8)
RBC # BLD: 3.71 M/UL — LOW (ref 4.2–5.4)
RBC # FLD: 13.2 % — SIGNIFICANT CHANGE UP (ref 11.5–14.5)
SODIUM SERPL-SCNC: 133 MMOL/L — LOW (ref 135–146)
WBC # BLD: 9.27 K/UL — SIGNIFICANT CHANGE UP (ref 4.8–10.8)
WBC # FLD AUTO: 9.27 K/UL — SIGNIFICANT CHANGE UP (ref 4.8–10.8)

## 2022-01-29 PROCEDURE — 71045 X-RAY EXAM CHEST 1 VIEW: CPT | Mod: 26

## 2022-01-29 PROCEDURE — 99232 SBSQ HOSP IP/OBS MODERATE 35: CPT

## 2022-01-29 PROCEDURE — 99024 POSTOP FOLLOW-UP VISIT: CPT

## 2022-01-29 PROCEDURE — 99233 SBSQ HOSP IP/OBS HIGH 50: CPT | Mod: 25,24

## 2022-01-29 RX ORDER — LACTULOSE 10 G/15ML
10 SOLUTION ORAL EVERY 8 HOURS
Refills: 0 | Status: DISCONTINUED | OUTPATIENT
Start: 2022-01-29 | End: 2022-01-30

## 2022-01-29 RX ORDER — HEPARIN SODIUM 5000 [USP'U]/ML
5000 INJECTION INTRAVENOUS; SUBCUTANEOUS EVERY 8 HOURS
Refills: 0 | Status: DISCONTINUED | OUTPATIENT
Start: 2022-01-29 | End: 2022-01-30

## 2022-01-29 RX ORDER — MAGNESIUM SULFATE 500 MG/ML
2 VIAL (ML) INJECTION ONCE
Refills: 0 | Status: COMPLETED | OUTPATIENT
Start: 2022-01-29 | End: 2022-01-29

## 2022-01-29 RX ORDER — SODIUM CHLORIDE 9 MG/ML
1000 INJECTION, SOLUTION INTRAVENOUS
Refills: 0 | Status: DISCONTINUED | OUTPATIENT
Start: 2022-01-29 | End: 2022-01-29

## 2022-01-29 RX ADMIN — PIPERACILLIN AND TAZOBACTAM 25 GRAM(S): 4; .5 INJECTION, POWDER, LYOPHILIZED, FOR SOLUTION INTRAVENOUS at 14:42

## 2022-01-29 RX ADMIN — PIPERACILLIN AND TAZOBACTAM 25 GRAM(S): 4; .5 INJECTION, POWDER, LYOPHILIZED, FOR SOLUTION INTRAVENOUS at 21:02

## 2022-01-29 RX ADMIN — Medication 5 MILLIGRAM(S): at 21:00

## 2022-01-29 RX ADMIN — Medication 200 MILLIGRAM(S): at 05:59

## 2022-01-29 RX ADMIN — PIPERACILLIN AND TAZOBACTAM 25 GRAM(S): 4; .5 INJECTION, POWDER, LYOPHILIZED, FOR SOLUTION INTRAVENOUS at 06:00

## 2022-01-29 RX ADMIN — Medication 25 GRAM(S): at 04:08

## 2022-01-29 RX ADMIN — Medication 200 MILLIGRAM(S): at 18:06

## 2022-01-29 RX ADMIN — SENNA PLUS 2 TABLET(S): 8.6 TABLET ORAL at 21:00

## 2022-01-29 NOTE — PROGRESS NOTE ADULT - ASSESSMENT
ASSESSMENT:  35 y/o female with PMHx of HTN, who was recently admitted for recurrent biliary colic on 1/17/2022, and underwent laparoscopic cholecystectomy on 01/19/2022 and discharged home on 1/20/22 returning with obstructive Jaundice and Ultrasound showing CBD dilatation of 0.6mm. s/p ERCP with sphincterotomy and CBD and PD stents     PLAN:  - Advance diet as tolerated to regular Low Fat  - Trend LFT's  -Pain Control: Multimodal with Gabapentin 300mg TID, Robaxin 750mg QID, Oxycodone 5mg q6hrs PRN moderate pain (holding Tylenol in setting of Transaminitis)  -CVS: EKG  -Pulmonary: Incentive Spirometry q1 hr while awake  -FEN:  IVF-   LR @ 125  mL/hr, Monitor Strict Intake/Output [id number], Replete Electrolytes PRN, Diet- NPO for possible ERCP  -ABX: ZOSYN  -PPX: Protonix, holding chemo-ppx, SCDs   -Activity: ambulate as tolerated  -Additional Consultations: Advanced GI - CLD, trend LFT's, Stent removal 4-6 weeks     LINES: Peripheral IV    TRAUMA SPECTRA: [id number]

## 2022-01-29 NOTE — PROGRESS NOTE ADULT - ASSESSMENT
37 y/o female with PMHx of HTN, Anemia , recent pregnancy who si s/p recent CCY done on admission 1/2022 who presents to [facility] with chills and jaundice.    #)Elevated liver enzymes-improving   #)hyperbilurubinemia likely sec to CBD stones   #)s/p CCY 1/19/2022  -s/p ERCP Using double wire technique, 5 F X 3 cm pancreatic stent was placed. CBD was cannulated, Sphinctertomoy performed. Cholangiogram showed multiple filling defects, Multiple stones were removed using extraction balloon and basket. CBD was irrigated, A 7 F X 7 cm stent was placed.  -Hemodynamically stable  -Denies any abdominal pain  -US showed hemangioma in liver and CBD 6mm    Recs:   Can start clear liquid diet   Trend LFT  Stent removal in 4-6 weeks      35 y/o female with PMHx of HTN, Anemia , recent pregnancy who si s/p recent CCY done on admission 1/2022 who presents to [facility] with chills and jaundice.    #)Elevated liver enzymes-improving   #)hyperbilurubinemia likely sec to CBD stones   #)s/p CCY 1/19/2022  -s/p ERCP Using double wire technique, 5 F X 3 cm pancreatic stent was placed. CBD was cannulated, Sphinctertomoy performed. Cholangiogram showed multiple filling defects, Multiple stones were removed using extraction balloon and basket. CBD was irrigated, A 7 F X 7 cm stent was placed.  -Hemodynamically stable  -Denies any abdominal pain  -US showed hemangioma in liver and CBD 6mm    Recs:   Can start clear liquid diet   Trend LFT  Stent removal in 4-6 weeks

## 2022-01-29 NOTE — PROGRESS NOTE ADULT - SUBJECTIVE AND OBJECTIVE BOX
Gastroenterology progress note:     Patient is a 36y old  Female who presents with a chief complaint of obstructive jaundice with suspcted cholangitis, s/p lap ricki 22 (2022 03:59)       Admitted on: 22    We are following the patient for jaundice and CBD stones     Interval History:  s/p ERCP yesterday   Denies any abd pain, N/V.          PAST MEDICAL & SURGICAL HISTORY:  Essential hypertension    Anemia    H/O:     S/P cholecystectomy        MEDICATIONS  (STANDING):  chlorhexidine 4% Liquid 1 Application(s) Topical <User Schedule>  gabapentin 300 milliGRAM(s) Oral three times a day  heparin   Injectable 5000 Unit(s) SubCutaneous every 8 hours  influenza   Vaccine 0.5 milliLiter(s) IntraMuscular once  labetalol 200 milliGRAM(s) Oral two times a day  lactated ringers. 1000 milliLiter(s) (75 mL/Hr) IV Continuous <Continuous>  lactulose Syrup 10 Gram(s) Oral every 8 hours  melatonin 5 milliGRAM(s) Oral at bedtime  pantoprazole  Injectable 40 milliGRAM(s) IV Push daily  piperacillin/tazobactam IVPB.. 3.375 Gram(s) IV Intermittent every 8 hours  polyethylene glycol 3350 17 Gram(s) Oral daily  senna 2 Tablet(s) Oral at bedtime    MEDICATIONS  (PRN):      Allergies  No Known Allergies      Review of Systems:   Cardiovascular:  No Chest Pain, No Palpitations  Respiratory:  No Cough, No Dyspnea  Gastrointestinal:  As described in HPI    Physical Examination:  T(C): 36.4 (22 @ 04:00), Max: 37.1 (22 @ 00:34)  HR: 68 (22 @ 06:00) (61 - 87)  BP: 127/76 (22 @ 06:00) (121/84 - 154/91)  RR: 18 (22 @ 05:00) (17 - 20)  SpO2: 98% (22 @ 06:00) (96% - 100%)  Weight (kg): 74.8 (22 @ 15:18)    22 @ 07:01  -  22 @ 07:00  --------------------------------------------------------  IN: 2500 mL / OUT: 0 mL / NET: 2500 mL      Constitutional: No acute distress.  Respiratory:  No signs of respiratory distress. Lung sounds are clear bilaterally.  Cardiovascular:  S1 S2, Regular rate and rhythm.  Abdominal: Abdomen is soft, symmetric, and non-tender without distention.    Skin: No rashes, No Jaundice.        Data:                        11.0   9.27  )-----------( 204      ( 2022 02:40 )             33.5     Hgb trend:  11.0  22 @ 02:40  11.6  22 @ 07:30  12.6  22 @ 13:51            133<L>  |  99  |  9<L>  ----------------------------<  110<H>  4.5   |  18  |  0.8    Ca    9.2      2022 02:40  Phos  4.0       Mg     1.8         TPro  6.4  /  Alb  4.0  /  TBili  5.0<H>  /  DBili  4.1<H>  /  AST  348<H>  /  ALT  900<H>  /  AlkPhos  393<H>      Liver panel trend:  TBili 5.0   /      /      /   AlkP 393   /   Tptn 6.4   /   Alb 4.0    /   DBili 4.1        TBili 4.9   /      /   ALT 1050   /   AlkP 444   /   Tptn 7.4   /   Alb 4.9    /   DBili 4.1        TBili 0.2   /   AST 39   /   ALT 34   /   AlkP 72   /   Tptn 6.5   /   Alb 4.2    /   DBili <0.2            PT/INR - ( 2022 07:30 )   PT: 12.70 sec;   INR: 1.11 ratio         PTT - ( 2022 07:30 )  PTT:40.4 sec       Radiology:    US Abdomen Upper Quadrant Right:   ACC: [id number] EXAM:  US ABDOMEN RT UPR QUADRANT                          PROCEDURE DATE:  2022          INTERPRETATION:  CLINICAL INFORMATION: Post lap cholecystectomy    COMPARISON: 2022    TECHNIQUE: Sonography of the right upper quadrant.    FINDINGS:    Liver: Normal in contour and echogenicity. A 2.4 x 2.4 cm echogenic   lesion in the right hepatic lobe likely a hemangioma.  Bile ducts: Normal caliber. Common bile duct measures 6 mm.  Gallbladder: Interval cholecystectomy.  Pancreas: Visualized portions are within normal limits.  Right kidney: 11.2 cm. No hydronephrosis. A nonobstructing right renal   calculus measures 4mm.  Ascites: None.  IVC: Visualized portions are within normal limits.    IMPRESSION:  1.  Since 2022, interval cholecystectomy with no evidence for   intra or extra hepatic biliary ductal dilation.  2.  Stable right hepatic lobe probable hemangioma.  3.  Unchanged nonobstructing right renal calculus.    --- End of Report ---            CHRISTOPHER RUSH MD; Attending Radiologist  This document has been electronically signed. 2022  4:11PM (22 @ 14:59)

## 2022-01-29 NOTE — PROGRESS NOTE ADULT - SUBJECTIVE AND OBJECTIVE BOX
GENERAL SURGERY PROGRESS NOTE    Patient: EVELINA READ , 36y (81)Female   MRN: [id number]  Location: [address]  Visit: 22 Inpatient  Date: 22 @ 14:25    Hospital Day #: 3  Post-Op Day #: 10    Procedure/Dx/Injuries: 37 y/o female with PMHx of HTN, who was recently admitted for recurrent biliary colic on 2022, and underwent laparoscopic cholecystectomy on 2022 and discharged home on 22 returning with obstructive Jaundice and Ultrasound showing CBD dilatation of 0.6mm. Pt is s/p ERCP with sphincterotomy and CBD and PD stent    Events of past 24 hours:    Night  -S/p ERCP: sphincterotomy, multiple stones, sludge and pigmented debris were removed from the bile duct   -plan to advance diet   -mag repleted    PAST MEDICAL & SURGICAL HISTORY:  Essential hypertension    Anemia    H/O:     S/P cholecystectomy    Vitals:   T(F): 97.5 (22 @ 04:00), Max: 98.7 (22 @ 00:34)  HR: 72 (22 @ 10:00)  BP: 143/88 (22 @ 10:00)  RR: 24 (22 @ 10:00)  SpO2: 98% (22 @ 10:00)      Diet, Clear Liquid      Fluids:     I & O's:    22 @ 07:01  -  22 @ 07:00  --------------------------------------------------------  IN:    IV PiggyBack: 250 mL    Lactated Ringers: 2250 mL  Total IN: 2500 mL    OUT:  Total OUT: 0 mL    Total NET: 2500 mL        Bowel Movement: : [] YES [x] NO  Flatus: : [x] YES [] NO    PHYSICAL EXAM:  General: NAD, AAOx3, calm and cooperative  HEENT: NCAT, MARCELLA, EOMI, Trachea ML, Neck supple  Cardiac: RRR S1, S2, no Murmurs, rubs or gallops  Respiratory: CTAB, normal respiratory effort, breath sounds equal BL, no wheeze, rhonchi or crackles  Abdomen: Soft, non-distended, non-tender, no rebound, no guarding. +BS.  Skin: Warm/dry, normal color, mild (improving) jaundice  Incision/wound: healing well, dressings in place, clean, dry and intact    MEDICATIONS  (STANDING):  chlorhexidine 4% Liquid 1 Application(s) Topical <User Schedule>  gabapentin 300 milliGRAM(s) Oral three times a day  heparin   Injectable 5000 Unit(s) SubCutaneous every 8 hours  labetalol 200 milliGRAM(s) Oral two times a day  lactulose Syrup 10 Gram(s) Oral every 8 hours  melatonin 5 milliGRAM(s) Oral at bedtime  pantoprazole  Injectable 40 milliGRAM(s) IV Push daily  piperacillin/tazobactam IVPB.. 3.375 Gram(s) IV Intermittent every 8 hours  polyethylene glycol 3350 17 Gram(s) Oral daily  senna 2 Tablet(s) Oral at bedtime    MEDICATIONS  (PRN):      DVT PROPHYLAXIS: heparin   Injectable 5000 Unit(s) SubCutaneous every 8 hours    GI PROPHYLAXIS: pantoprazole  Injectable 40 milliGRAM(s) IV Push daily    ANTICOAGULATION:   ANTIBIOTICS:  piperacillin/tazobactam IVPB.. 3.375 Gram(s)            LAB/STUDIES:  Labs:  CAPILLARY BLOOD GLUCOSE                              11.0   9.27  )-----------( 204      ( 2022 02:40 )             33.5       Auto Neutrophil %: 85.2 % (22 @ 02:40)  Auto Immature Granulocyte %: 0.5 % (22 @ 02:40)        133<L>  |  99  |  9<L>  ----------------------------<  110<H>  4.5   |  18  |  0.8      Calcium, Total Serum: 9.2 mg/dL (22 @ 02:40)      LFTs:             6.5  | 3.6  | 341      ------------------[402     ( 2022 10:25 )  4.0  | 2.6  | 880         Lipase:x      Amylase:x         Lactate, Blood: 0.7 mmol/L (22 @ 13:51)      Coags:     12.70  ----< 1.11    ( 2022 07:30 )     40.4                Urinalysis Basic - ( 2022 15:36 )    Color: Stephania / Appearance: Slightly Turbid / S.030 / pH: x  Gluc: x / Ketone: Moderate  / Bili: Large / Urobili: 6 mg/dL   Blood: x / Protein: 30 mg/dL / Nitrite: Negative   Leuk Esterase: Negative / RBC: 2 /HPF / WBC 2 /HPF   Sq Epi: x / Non Sq Epi: 5 /HPF / Bacteria: Negative    IMAGING:  Patient is S/P ERCP:   radiograph was taken, surgical clips seen in place. Limited views of the esophagus, stomach and duodenum were unremarkable. The major papilla was noted in the second portion of duodenum and appeared normal.  The major papilla was cannulated, using wire-guided cannulation by double wire technique, A 5 F X 3 cm pancreatic stent was placed, then using a Flowcut sphinctertome preloaded with a 0.025 visiglide 450 cm guidewire the common bile duct was cannulated, the wire was seen in the CBD, cholangiogram was performed confirming location and showed a filling defects in the CBD, the CBD was dilated but otherwise unremarkable. Sphincterotomy was performed, without bleeding. Multiple stones, sludge and pigmented debris were removed from the bile duct using a biliary, stone extraction balloon and basket. The duct was then swept multiple times and was irrigated with 40 ml of sterile water. An occlusion cholangiogram was performed opacifying the bile duct and the cystic duct. Due to persistent sludge and debris in effluent, a stent was placed. Over the guidewire in the CBD, a 7F X 7 cm straight plastic biliary stent was placed in the CBD under fluoroscopy and appeared in satisfactory position. The PD was not injected in this exam. The scope was then removed and procedure terminated. Post-procedure xray did not show air under the diaphragm.    ACCESS/ DEVICES:  [x] Peripheral IV  [ ] Central Venous Line	[ ] R	[ ] L	[ ] IJ	[ ] Fem	[ ] SC	Placed:   [ ] Arterial Line		[ ] R	[ ] L	[ ] Fem	[ ] Rad	[ ] Ax	Placed:   [ ] PICC:					[ ] Mediport  [ ] Urinary Catheter,  Date Placed:   [ ] Chest tube: [ ] Right, [ ] Left  [ ] CAREY/Sammy Drains

## 2022-01-29 NOTE — DIETITIAN INITIAL EVALUATION ADULT. - ORAL INTAKE PTA/DIET HISTORY
Pt reports she normally eats healthy with chicken and vegetables. No food allergy/intolerance. No dietary restrictions. No chewing/swallowing issues. Takes prenatal vitamins daily. Pt reports her appetite was at baseline until 1/17/22 where she presented to [facility] with abdominal pain, for which she underwent Laparoscopic cholecystectomy on 1/29/22. After discharge, she was not able to have more than 50% of her usual intake, subsequently she lost ~10lbs. UBW 170lbs. Ht 5'2".

## 2022-01-29 NOTE — DIETITIAN INITIAL EVALUATION ADULT. - OTHER INFO
Subjective: pt states she is tolerating clears. Denies N/V. States she finally had a BM after 12 days. Looking forward to discharge.    Pt is a 35 y/o female with PMHx of HTN, who was recently admitted for recurrent biliary colic on 1/17/2022, and underwent laparoscopic cholecystectomy on 01/19/2022, pt was discharged home on 1/20/22.  Pt returns to the ED today with c/o weakness, chills, and progressive jaundice x 2-3 days.  Pt also states that she has not had BM x 11 days.  Labs in ED suggestive of obstructive Jaundice with US showing CBD dilatation of 0.6 mm. S/p ERCP 1/28-  sphincterotomy, multiple stones, sludge and pigmented debris were removed from the bile duct.     ICU Vital Signs Last 24 Hrs  T(C): 36.4 (29 Jan 2022 04:00), Max: 37.1 (29 Jan 2022 00:34)  T(F): 97.5 (29 Jan 2022 04:00), Max: 98.7 (29 Jan 2022 00:34)  HR: 70 (29 Jan 2022 14:00) (62 - 87)  BP: 140/86 (29 Jan 2022 12:00) (121/84 - 154/91)  BP(mean): 109 (29 Jan 2022 10:00) (90 - 115)  ABP: --  ABP(mean): --  RR: 20 (29 Jan 2022 14:00) (17 - 24)  SpO2: 96% (29 Jan 2022 14:00) (96% - 100%)

## 2022-01-29 NOTE — DIETITIAN INITIAL EVALUATION ADULT. - PERTINENT MEDS FT
MEDICATIONS  (STANDING):  lactulose Syrup 10 Gram(s) Oral every 8 hours  melatonin 5 milliGRAM(s) Oral at bedtime  pantoprazole  Injectable 40 milliGRAM(s) IV Push daily  piperacillin/tazobactam IVPB.. 3.375 Gram(s) IV Intermittent every 8 hours  polyethylene glycol 3350 17 Gram(s) Oral daily  senna 2 Tablet(s) Oral at bedtime    MEDICATIONS  (PRN): none

## 2022-01-29 NOTE — DIETITIAN INITIAL EVALUATION ADULT. - OTHER CALCULATIONS
Est kcal/pro needs based on stress, borderline overweight/obese. MSJ*SF 1.0 to aid weight management. Std range fluids.

## 2022-01-29 NOTE — PROGRESS NOTE ADULT - SUBJECTIVE AND OBJECTIVE BOX
EVELINA READ  [id number]  36y Female    Indication for ICU admission: obstructive jaundice with suspcted cholangitis, s/p lap ricki 22    Admit Date:22  ICU Date: 22  OR Date: 22    Allergies:  No Known Allergies    PAST MEDICAL & SURGICAL HISTORY:  Essential hypertension  Anemia  Asthma  H/O:   S/P cholecystectomy      Home Medications:  iron sulfate:  (2022 05:17)  labetalol 200 mg oral tablet: 1 tab(s) orally 2 times a day (2022 05:17)      24HRS EVENT:    Night  -S/p ERCP: sphincterotomy, multiple stones, sludge and pigmented debris were removed from the bile duct   -plan to advance diet   -mag repleted    Day  ERCP today      DVT PTX: HSQ, last dose midnight, held for procedure    GI PTX:pantoprazole  Injectable 40 milliGRAM(s) IV Push daily      ***Tubes/Lines/Drains  ***  Peripheral IV      REVIEW OF SYSTEMS    [x ] A ten-point review of systems was otherwise negative except as noted.  [ ] Due to altered mental status/intubation, subjective information were not able to be obtained from the patient. History was obtained, to the extent possible, from review of the chart and collateral sources of information.

## 2022-01-29 NOTE — DIETITIAN INITIAL EVALUATION ADULT. - PERTINENT LABORATORY DATA
01-29    133<L>  |  99  |  9<L>  ----------------------------<  110<H>  4.5   |  18  |  0.8    Ca    9.2      29 Jan 2022 02:40  Phos  4.0     01-29  Mg     1.8     01-29    TPro  6.5  /  Alb  4.0  /  TBili  3.6<H>  /  DBili  2.6<H>  /  AST  341<H>  /  ALT  880<H>  /  AlkPhos  402<H>  01-29

## 2022-01-29 NOTE — PROGRESS NOTE ADULT - ASSESSMENT
Assessment & Plan  35 y/o female with PMHx of HTN, who was recently admitted for recurrent biliary colic on 1/17/2022, and underwent laparoscopic cholecystectomy on 01/19/2022, pt was discharged home on 1/20/22.  Pt returns to the ED today with c/o weakness, chills, and progressive jaundice x 2-3 days.  Pt also states that she has not had BM x 11 days.  Labs in ED suggestive of obstructive Jaundice with US showing CBD dilatation of 0.6 mm.     NEURO:    Acute pain-controlled with tylenol, gabapentin, robaxin, and oxy prn       RESP:   - Saturating well on RA  - Encourage IS  - CXR no acute findings        CARDS:   # HTN  -continue labetalol 200 two times a day  -keep normotensive      GI/NUTR:   #obstructive jaundice s/p lap bernie 1/19/22  - plan for ERCP with GI today  - keep NPO, HSQ held after midnight 1/27  - Transaminitis improving: AST/ALT  429/1050 --> 348/900    Diet: NPO for procedure    GI Prophylaxis- PPI    Bowel regimen- senna, miralax      /RENAL:   Monitor UO- voiding freely  IVF: LR @ 125/hr     Labs:          BUN/Cr- 11/0.7  -->   8/0.7          Electrolytes-Na 134 // K 4.0 // Mg 3.1 //  Phos 1.9      HEME/ONC:       DVT prophylaxis - HSQ (on hold for ERCP), SCDs    Labs: Hb/Hct:  12.6/37.9  -->   11.6/35.7   --> 11.0/33.5                Plts:  224  -->   206   -->  204              PTT/INR:  40.9/1.11  --->   40.4/1.11      ID:  WBC- 7.08  --->>   6.3 --> 9.27  T(F): 98.7 (29 Jan 2022 00:34), Max: 98.7 (29 Jan 2022 00:34)    Antibiotics - piperacillin/tazobactam IVPB 3.375 every 8 hours  - monitor for signs of infection         ENDO:     POCT Blood Glucose.: 70 mg/dL (27 Jan 2022 21:19)    -HA1C - 5.1%    LINES/DRAINS:  PIV    DISPO:    SDU

## 2022-01-30 ENCOUNTER — TRANSCRIPTION ENCOUNTER (OUTPATIENT)
Age: 37
End: 2022-01-30

## 2022-01-30 VITALS — HEART RATE: 78 BPM | OXYGEN SATURATION: 98 % | RESPIRATION RATE: 21 BRPM

## 2022-01-30 LAB
ALBUMIN SERPL ELPH-MCNC: 3.9 G/DL — SIGNIFICANT CHANGE UP (ref 3.5–5.2)
ALP SERPL-CCNC: 364 U/L — HIGH (ref 30–115)
ALT FLD-CCNC: 882 U/L — HIGH (ref 0–41)
ANION GAP SERPL CALC-SCNC: 10 MMOL/L — SIGNIFICANT CHANGE UP (ref 7–14)
AST SERPL-CCNC: 304 U/L — HIGH (ref 0–41)
BASOPHILS # BLD AUTO: 0.03 K/UL — SIGNIFICANT CHANGE UP (ref 0–0.2)
BASOPHILS NFR BLD AUTO: 0.4 % — SIGNIFICANT CHANGE UP (ref 0–1)
BILIRUB DIRECT SERPL-MCNC: 1.1 MG/DL — HIGH (ref 0–0.3)
BILIRUB INDIRECT FLD-MCNC: 1 MG/DL — SIGNIFICANT CHANGE UP (ref 0.2–1.2)
BILIRUB SERPL-MCNC: 2.1 MG/DL — HIGH (ref 0.2–1.2)
BUN SERPL-MCNC: 9 MG/DL — LOW (ref 10–20)
CALCIUM SERPL-MCNC: 9 MG/DL — SIGNIFICANT CHANGE UP (ref 8.5–10.1)
CHLORIDE SERPL-SCNC: 100 MMOL/L — SIGNIFICANT CHANGE UP (ref 98–110)
CO2 SERPL-SCNC: 24 MMOL/L — SIGNIFICANT CHANGE UP (ref 17–32)
CREAT SERPL-MCNC: 0.8 MG/DL — SIGNIFICANT CHANGE UP (ref 0.7–1.5)
EOSINOPHIL # BLD AUTO: 0.16 K/UL — SIGNIFICANT CHANGE UP (ref 0–0.7)
EOSINOPHIL NFR BLD AUTO: 1.9 % — SIGNIFICANT CHANGE UP (ref 0–8)
GLUCOSE SERPL-MCNC: 81 MG/DL — SIGNIFICANT CHANGE UP (ref 70–99)
HCT VFR BLD CALC: 33.5 % — LOW (ref 37–47)
HGB BLD-MCNC: 11 G/DL — LOW (ref 12–16)
IMM GRANULOCYTES NFR BLD AUTO: 0.5 % — HIGH (ref 0.1–0.3)
LYMPHOCYTES # BLD AUTO: 1.8 K/UL — SIGNIFICANT CHANGE UP (ref 1.2–3.4)
LYMPHOCYTES # BLD AUTO: 21.6 % — SIGNIFICANT CHANGE UP (ref 20.5–51.1)
MAGNESIUM SERPL-MCNC: 2 MG/DL — SIGNIFICANT CHANGE UP (ref 1.8–2.4)
MCHC RBC-ENTMCNC: 30.1 PG — SIGNIFICANT CHANGE UP (ref 27–31)
MCHC RBC-ENTMCNC: 32.8 G/DL — SIGNIFICANT CHANGE UP (ref 32–37)
MCV RBC AUTO: 91.8 FL — SIGNIFICANT CHANGE UP (ref 81–99)
MONOCYTES # BLD AUTO: 0.53 K/UL — SIGNIFICANT CHANGE UP (ref 0.1–0.6)
MONOCYTES NFR BLD AUTO: 6.3 % — SIGNIFICANT CHANGE UP (ref 1.7–9.3)
NEUTROPHILS # BLD AUTO: 5.79 K/UL — SIGNIFICANT CHANGE UP (ref 1.4–6.5)
NEUTROPHILS NFR BLD AUTO: 69.3 % — SIGNIFICANT CHANGE UP (ref 42.2–75.2)
NRBC # BLD: 0 /100 WBCS — SIGNIFICANT CHANGE UP (ref 0–0)
PHOSPHATE SERPL-MCNC: 3.1 MG/DL — SIGNIFICANT CHANGE UP (ref 2.1–4.9)
PLATELET # BLD AUTO: 179 K/UL — SIGNIFICANT CHANGE UP (ref 130–400)
POTASSIUM SERPL-MCNC: 4.1 MMOL/L — SIGNIFICANT CHANGE UP (ref 3.5–5)
POTASSIUM SERPL-SCNC: 4.1 MMOL/L — SIGNIFICANT CHANGE UP (ref 3.5–5)
PROT SERPL-MCNC: 6.6 G/DL — SIGNIFICANT CHANGE UP (ref 6–8)
RBC # BLD: 3.65 M/UL — LOW (ref 4.2–5.4)
RBC # FLD: 13.5 % — SIGNIFICANT CHANGE UP (ref 11.5–14.5)
SODIUM SERPL-SCNC: 134 MMOL/L — LOW (ref 135–146)
WBC # BLD: 8.35 K/UL — SIGNIFICANT CHANGE UP (ref 4.8–10.8)
WBC # FLD AUTO: 8.35 K/UL — SIGNIFICANT CHANGE UP (ref 4.8–10.8)

## 2022-01-30 PROCEDURE — 93010 ELECTROCARDIOGRAM REPORT: CPT

## 2022-01-30 PROCEDURE — 99233 SBSQ HOSP IP/OBS HIGH 50: CPT | Mod: 25,24

## 2022-01-30 PROCEDURE — 99232 SBSQ HOSP IP/OBS MODERATE 35: CPT | Mod: 24,25

## 2022-01-30 RX ORDER — ACETAMINOPHEN 500 MG
650 TABLET ORAL EVERY 6 HOURS
Refills: 0 | Status: DISCONTINUED | OUTPATIENT
Start: 2022-01-30 | End: 2022-01-30

## 2022-01-30 RX ORDER — INFLUENZA VIRUS VACCINE 15; 15; 15; 15 UG/.5ML; UG/.5ML; UG/.5ML; UG/.5ML
0.5 SUSPENSION INTRAMUSCULAR ONCE
Refills: 0 | Status: DISCONTINUED | OUTPATIENT
Start: 2022-01-30 | End: 2022-01-30

## 2022-01-30 RX ADMIN — Medication 200 MILLIGRAM(S): at 04:49

## 2022-01-30 RX ADMIN — CHLORHEXIDINE GLUCONATE 1 APPLICATION(S): 213 SOLUTION TOPICAL at 04:50

## 2022-01-30 RX ADMIN — Medication 650 MILLIGRAM(S): at 05:33

## 2022-01-30 RX ADMIN — Medication 650 MILLIGRAM(S): at 04:49

## 2022-01-30 RX ADMIN — PIPERACILLIN AND TAZOBACTAM 25 GRAM(S): 4; .5 INJECTION, POWDER, LYOPHILIZED, FOR SOLUTION INTRAVENOUS at 04:49

## 2022-01-30 NOTE — DISCHARGE NOTE NURSING/CASE MANAGEMENT/SOCIAL WORK - PATIENT PORTAL LINK FT
You can access the FollowMyHealth Patient Portal offered by [facility] by registering at the following website: [email or web address]. By joining "[employer]’s FollowMyHealth portal, you will also be able to view your health information using other applications (apps) compatible with our system.

## 2022-01-30 NOTE — PROGRESS NOTE ADULT - SUBJECTIVE AND OBJECTIVE BOX
GENERAL SURGERY PROGRESS NOTE    Patient: EVELINA READ , 36y ([birth year])Female   MRN: [id number]  Location: [address]  Visit: 22 Inpatient  Date: 22 @ 02:51    Hospital Day #: 4  Post-Op Day #: 11    Procedure/Dx/Injuries:   37 y/o female with PMHx of HTN, who was recently admitted for recurrent biliary colic on 2022, and underwent laparoscopic cholecystectomy on 2022 and discharged home on 22 returning with obstructive Jaundice and Ultrasound showing CBD dilatation of 0.6mm. Pt is s/p ERCP with sphincterotomy and CBD and PD stent    Events of past 24 hours:  Pt seen and examined at bedside no acute events overnight.     PAST MEDICAL & SURGICAL HISTORY:  Essential hypertension    Anemia    H/O:     S/P cholecystectomy        Vitals:   T(F): 96.8 (22 @ 00:15), Max: 98.6 (22 @ 12:00)  HR: 59 (22 @ 00:00)  BP: 117/70 (22 @ 00:00)  RR: 14 (22 @ 00:00)  SpO2: 97% (22 @ 00:00)      Diet, Clear Liquid      Fluids:     I & O's:    22 @ 07:01  -  22 @ 07:00  --------------------------------------------------------  IN:    IV PiggyBack: 250 mL    Lactated Ringers: 2250 mL  Total IN: 2500 mL    OUT:  Total OUT: 0 mL    Total NET: 2500 mL    Bowel Movement: : [] YES [x] NO  Flatus: : [x] YES [] NO    PHYSICAL EXAM:  General: NAD, AAOx3, calm and cooperative  HEENT: NCAT, MARCELLA, EOMI, Trachea ML, Neck supple  Cardiac: RRR S1, S2, no Murmurs, rubs or gallops  Respiratory: CTAB, normal respiratory effort, breath sounds equal BL, no wheeze, rhonchi or crackles  Abdomen: Soft, non-distended, non-tender, no rebound, no guarding. +BS.  Skin: Warm/dry, normal color, mild (improving) jaundice  Incision/wound: healing well, dressings in place, clean, dry and intact    MEDICATIONS  (STANDING):  chlorhexidine 4% Liquid 1 Application(s) Topical <User Schedule>  gabapentin 300 milliGRAM(s) Oral three times a day  heparin   Injectable 5000 Unit(s) SubCutaneous every 8 hours  labetalol 200 milliGRAM(s) Oral two times a day  lactulose Syrup 10 Gram(s) Oral every 8 hours  melatonin 5 milliGRAM(s) Oral at bedtime  pantoprazole  Injectable 40 milliGRAM(s) IV Push daily  piperacillin/tazobactam IVPB.. 3.375 Gram(s) IV Intermittent every 8 hours  polyethylene glycol 3350 17 Gram(s) Oral daily  senna 2 Tablet(s) Oral at bedtime    MEDICATIONS  (PRN):      DVT PROPHYLAXIS: heparin   Injectable 5000 Unit(s) SubCutaneous every 8 hours    GI PROPHYLAXIS: pantoprazole  Injectable 40 milliGRAM(s) IV Push daily    ANTICOAGULATION:   ANTIBIOTICS:  piperacillin/tazobactam IVPB.. 3.375 Gram(s)            LAB/STUDIES:  Labs:  CAPILLARY BLOOD GLUCOSE                              11.0   8.35  )-----------( 179      ( 2022 02:35 )             33.5       Auto Neutrophil %: 69.3 % (22 @ 02:35)  Auto Immature Granulocyte %: 0.5 % (22 @ 02:35)        133<L>  |  99  |  9<L>  ----------------------------<  110<H>  4.5   |  18  |  0.8          LFTs:             6.5  | 3.6  | 341      ------------------[402     ( 2022 10:25 )  4.0  | 2.6  | 880         Lipase:x      Amylase:x         Lactate, Blood: 0.7 mmol/L (22 @ 13:51)      Coags:     12.70  ----< 1.11    ( 2022 07:30 )     40.4      IMAGING:  < from: Xray Chest 1 View- PORTABLE-Routine (Xray Chest 1 View- PORTABLE-Routine in AM.) (22 @ 07:05) >  Impression:    No radiographic evidence of acute cardiopulmonary disease.    < end of copied text >    ACCESS/ DEVICES:  [ ] Peripheral IV  [ ] Central Venous Line	[ ] R	[ ] L	[ ] IJ	[ ] Fem	[ ] SC	Placed:   [ ] Arterial Line		[ ] R	[ ] L	[ ] Fem	[ ] Rad	[ ] Ax	Placed:   [ ] PICC:					[ ] Mediport  [ ] Urinary Catheter,  Date Placed:   [ ] Chest tube: [ ] Right, [ ] Left  [ ] CAREY/Sammy Drains

## 2022-01-30 NOTE — PROGRESS NOTE ADULT - TIME BILLING
35 y/o female with PMHx of HTN, who was recently admitted for recurrent biliary colic on 1/17/2022, and underwent laparoscopic cholecystectomy on 01/19/2022 and discharged home on 1/20/22 returning with obstructive Jaundice and Ultrasound showing CBD dilatation of 0.6mm. s/p ERCP with sphincterotomy and CBD and PD stents     PLAN:  - tolerating reg diet  - Trend LFT's >> bilirubin downtrending   -Pain Control:    - dc today   - f/u gi outpt
37 y/o female with PMHx of HTN, who was recently admitted for recurrent biliary colic on 1/17/2022, and underwent laparoscopic cholecystectomy on 01/19/2022 and discharged home on 1/20/22 returning with obstructive Jaundice and Ultrasound showing CBD dilatation of 0.6mm. s/p ERCP with sphincterotomy and CBD and PD stents     PLAN:  - Advance diet as tolerated to regular Low Fat  - Trend LFT's  -Pain Control: Multimodal with Gabapentin 300mg TID, Robaxin 750mg QID, Oxycodone 5mg q6hrs PRN moderate pain (holding Tylenol in setting of Transaminitis)  - dc planning

## 2022-01-30 NOTE — PROGRESS NOTE ADULT - ASSESSMENT
ASSESSMENT:  35 y/o female with PMHx of HTN, who was recently admitted for recurrent biliary colic on 1/17/2022, and underwent laparoscopic cholecystectomy on 01/19/2022 and discharged home on 1/20/22 returning with obstructive Jaundice and Ultrasound showing CBD dilatation of 0.6mm. s/p ERCP with sphincterotomy and CBD and PD stents     PLAN:  - Advance diet as tolerated to regular Low Fat  - Trend LFT's  -Pain Control: Multimodal with Gabapentin 300mg TID, Robaxin 750mg QID, Oxycodone 5mg q6hrs PRN moderate pain (holding Tylenol in setting of Transaminitis)  -CVS: EKG  -Pulmonary: Incentive Spirometry q1 hr while awake  -FEN:  IVF-   LR @ 125  mL/hr, Monitor Strict Intake/Output t4gpegs, Replete Electrolytes PRN, Diet- NPO for possible ERCP  -ABX: ZOSYN  -PPX: Protonix, holding chemo-ppx, SCDs   -Activity: ambulate as tolerated  -Additional Consultations: Advanced GI - CLD, trend LFT's, Stent removal 4-6 weeks     LINES: Peripheral IV    TRAUMA SPECTRA: [id number]

## 2022-01-30 NOTE — PROGRESS NOTE ADULT - SUBJECTIVE AND OBJECTIVE BOX
EVELINA READ  [id number]  36y Female    Indication for ICU admission: obstructive jaundice with suspcted cholangitis, s/p lap ricki 22    Admit Date:22  ICU Date: 22  OR Date: 22    Allergies:  No Known Allergies    PAST MEDICAL & SURGICAL HISTORY:  Essential hypertension  Anemia  Asthma  H/O:   S/P cholecystectomy      Home Medications:  iron sulfate:  (2022 05:17)  labetalol 200 mg oral tablet: 1 tab(s) orally 2 times a day (2022 05:17)      24HRS EVENT:    Night:  - no issues     Day  -Hep sq re-started  -d/c robaxin and oxy  -start clears  -decrease IVF: LR @ 75  -add lactulose 10g q8, had 2 BMs      DVT PTX: HSQ,     GI PTX:pantoprazole  Injectable 40 milliGRAM(s) IV Push daily      ***Tubes/Lines/Drains  ***  Peripheral IV      REVIEW OF SYSTEMS    [x ] A ten-point review of systems was otherwise negative except as noted.  [ ] Due to altered mental status/intubation, subjective information were not able to be obtained from the patient. History was obtained, to the extent possible, from review of the chart and collateral sources of information.       EVELINA READ  [id number]  36y Female    Indication for ICU admission: obstructive jaundice with suspcted cholangitis, s/p lap ricki 22    Admit Date:22  ICU Date: 22  OR Date: 22    Allergies:  No Known Allergies    PAST MEDICAL & SURGICAL HISTORY:  Essential hypertension  Anemia  Asthma  H/O:   S/P cholecystectomy      Home Medications:  iron sulfate:  (2022 05:17)  labetalol 200 mg oral tablet: 1 tab(s) orally 2 times a day (2022 05:17)      24HRS EVENT:    Night:  - no issues     Day  -Hep sq re-started  -d/c robaxin and oxy  -start clears  -decrease IVF: LR @ 75  -add lactulose 10g q8, had 2 BMs      DVT PTX: HSQ,     GI PTX:pantoprazole  Injectable 40 milliGRAM(s) IV Push daily      ***Tubes/Lines/Drains  ***  Peripheral IV      REVIEW OF SYSTEMS    [x ] A ten-point review of systems was otherwise negative except as noted.  [ ] Due to altered mental status/intubation, subjective information were not able to be obtained from the patient. History was obtained, to the extent possible, from review of the chart and collateral sources of information.        PHYSICAL EXAM:     a&ox3, follows commands, CASTRO  no acute distress  equal chest rise b/l, CTAB  RRR; S1, S2 heard. No m/r/g  abdomen soft, non-tender  extremities soft

## 2022-01-30 NOTE — DISCHARGE NOTE PROVIDER - CARE PROVIDER_API CALL
Cullen Santamaria)  Surgery; Surgical Critical Care  [address] [place] [address]  Phone: [phone]  Fax: [phone]  Follow Up Time:     Philippe Garcia)  Internal Medicine  [address] [place] [address]  Phone: [phone]  Fax: [phone]  Follow Up Time:     QUINTEN WILCOX  Internal Medicine  [address] [place] [address]  Phone: [phone]  Fax: ()-  Follow Up Time:

## 2022-01-30 NOTE — PROGRESS NOTE ADULT - ASSESSMENT
Assessment and Plan:   35 y/o female with PMHx of HTN, who was recently admitted for recurrent biliary colic on 1/17/2022, and underwent laparoscopic cholecystectomy on 01/19/2022, pt was discharged home on 1/20/22.  Pt returns to the ED today with c/o weakness, chills, and progressive jaundice x 2-3 days.  Pt also states that she has not had BM x 11 days.  Labs in ED suggestive of obstructive Jaundice with US showing CBD dilatation of 0.6 mm.     NEURO:    Acute pain-controlled with tylenol, gabapentin,     RESP:   - Saturating well on RA  - Encourage IS  - CXR no acute findings        CARDS:   # HTN  -continue labetalol 200 two times a day  -keep normotensive      GI/NUTR:   #obstructive jaundice s/p lap bernie 1/19/22  - ERCP 1/28- sphincterotomy, multiple stones, sludge and pgimented debris were removed from the bile duct   - Transaminitis improving: AST/ALT  429/1050 --> 348/900, Tbili improving 5>3.6    Diet: clears     GI Prophylaxis- PPI    Bowel regimen- senna, miralax, lactulose added - 2BM 1/29       /RENAL:   Monitor UO- voiding freely  IVF: LR @ 75/hr   Labs:          BUN/Cr- 8/0.7  -->,  9/0.8  -->,  9/0.8  -->          Electrolytes-Na 134 // K 4.1 // Mg 2.0 //  Phos 3.1 (01-30 @ 02:35)      HEME/ONC:       DVT prophylaxis - HSQ , SCDs    Labs: Hb/Hct:  11.0/33.5  -->,  11.0/33.5  -->                      Plts:  179  -->,  204  -->       ID:  WBC- 6.30  --->>,  9.27  --->>,  8.35  --->>  Temp trend- 24hrs T(F): 96.8 (01-30 @ 00:15), Max: 98.6 (01-29 @ 12:00)  Antibiotics - piperacillin/tazobactam IVPB 3.375 every 8 hours  - monitor for signs of infection         ENDO:     POCT Blood Glucose.: 70 mg/dL (27 Jan 2022 21:19)    -HA1C - 5.1%    LINES/DRAINS:  PIV    DISPO:    SDU

## 2022-01-30 NOTE — DISCHARGE NOTE NURSING/CASE MANAGEMENT/SOCIAL WORK - NSDCPEFALRISK_GEN_ALL_CORE
For information on Fall & Injury Prevention, visit: [email or web address] OR  [email or web address] OR  https://www.cdc.gov/steadi/patient.html

## 2022-01-30 NOTE — DISCHARGE NOTE PROVIDER - NSDCCPCAREPLAN_GEN_ALL_CORE_FT
PRINCIPAL DISCHARGE DIAGNOSIS  Diagnosis: Elevated bilirubin  Assessment and Plan of Treatment: Dressings: OK to shower normally.  Pain: Take over the counter pain medication if needed.  Activity: Avoid heavy lifting (anything over 10 pounds) for at least 6 weeks from date of surgery.  Follow up: Call to schedule a follow up appointment, [phone]. Follow up with GI team for Repeat ERCP in 4-6 weeks      SECONDARY DISCHARGE DIAGNOSES  Diagnosis: Jaundice  Assessment and Plan of Treatment:

## 2022-01-30 NOTE — DISCHARGE NOTE PROVIDER - NSDCFUADDINST_GEN_ALL_CORE_FT
Dressings: OK to shower normally  Pain: Take ibuprofen 500mg and/or tylenol 650mg as needed for pain  Activity: Avoid heavy lifting (anything over 10 pounds) for at least 6 weeks from date of surgery  Follow up: Call to schedule a follow up appointment, [phone].

## 2022-01-30 NOTE — DISCHARGE NOTE PROVIDER - HOSPITAL COURSE
1/27  37 y/o female with PMHx of HTN, who was recently admitted for recurrent biliary colic on 1/17/2022, and underwent laparoscopic cholecystectomy on 01/19/2022, pt was discharged home on 1/20/22.  Pt returns to the ED today with c/o weakness, chills, and progressive jaundice x 2-3 days.  Pt also states that she has not had BM x 11 days.  Denies fevers/lightheadedness or abdominal pain currently, although states yesterday she noticed abdominal discomfort.   Labs in ED suggestive of obstructive Jaundice with Ultrasound showing CBD dilatation of 0.6mm.  SICU/SDU called for close hemodynamic monitoring and suspected cholangitis.      1/28  Night  -S/p ERCP: sphincterotomy, multiple stones, sludge and pgimented debris were removed from the bile duct   -plan to advance diet 1/29  -mag repleted    1/28 Day  ERCP today    1/29  Night:  - chest pain -> EKG      1/29 Day  -Hep sq re-started  -d/c robaxin and oxy  -start clears  -decrease IVF: LR @ 75  -add lactulose 10g q8, had 2 BMs    1/30  tolerating fPO diet  VSS  ready for DC  will need repeat ERCP in 4-6 weeks    Vital Signs Last 24 Hrs  T(F): 97.6 (30 Jan 2022 04:00), Max: 98.6 (29 Jan 2022 12:00)  HR: 78 (30 Jan 2022 08:00) (59 - 78)  BP: 136/82 (30 Jan 2022 07:45) (111/65 - 143/88)  BP(mean): 103 (30 Jan 2022 07:45) (88 - 109)  RR: 21 (30 Jan 2022 08:00) (14 - 25)  SpO2: 98% (30 Jan 2022 08:00) (96% - 98%)

## 2022-01-30 NOTE — DISCHARGE NOTE PROVIDER - PROVIDER TOKENS
PROVIDER:[TOKEN:[[id number]]],PROVIDER:[TOKEN:[[id number]]],PROVIDER:[TOKEN:[[id number]]]

## 2022-01-31 PROBLEM — Z00.00 ENCOUNTER FOR PREVENTIVE HEALTH EXAMINATION: Status: ACTIVE | Noted: 2022-01-31

## 2022-02-02 ENCOUNTER — APPOINTMENT (OUTPATIENT)
Dept: SURGERY | Facility: CLINIC | Age: 37
End: 2022-02-02
Payer: COMMERCIAL

## 2022-02-02 VITALS
HEIGHT: 62 IN | SYSTOLIC BLOOD PRESSURE: 120 MMHG | TEMPERATURE: 97.3 F | BODY MASS INDEX: 30.36 KG/M2 | WEIGHT: 165 LBS | DIASTOLIC BLOOD PRESSURE: 80 MMHG | HEART RATE: 93 BPM | OXYGEN SATURATION: 98 %

## 2022-02-02 PROCEDURE — 99024 POSTOP FOLLOW-UP VISIT: CPT

## 2022-02-02 NOTE — ASSESSMENT
[FreeTextEntry1] : doing well, eating ok and passing gas, no BM yet. denies abdominal pain. wouinds ok. abdomen not distended, soft.\par plean: stool softners, will need stent removal by GI in 6 weeks. will d/c from clinic.

## 2022-02-03 DIAGNOSIS — Z20.822 CONTACT WITH AND (SUSPECTED) EXPOSURE TO COVID-19: ICD-10-CM

## 2022-02-03 DIAGNOSIS — K80.00 CALCULUS OF GALLBLADDER WITH ACUTE CHOLECYSTITIS WITHOUT OBSTRUCTION: ICD-10-CM

## 2022-02-03 DIAGNOSIS — I10 ESSENTIAL (PRIMARY) HYPERTENSION: ICD-10-CM

## 2022-02-03 DIAGNOSIS — D64.9 ANEMIA, UNSPECIFIED: ICD-10-CM

## 2022-02-03 DIAGNOSIS — R10.9 UNSPECIFIED ABDOMINAL PAIN: ICD-10-CM

## 2022-02-03 NOTE — HISTORY OF PRESENT ILLNESS
[Home] : at home, [unfilled] , at the time of the visit. [Medical Office: ([facility])___] : at the medical office located in  [Verbal consent obtained from patient] : the patient, [unfilled] [Test: ___] : [unfilled] [_________] : Performed [unfilled]

## 2022-02-04 ENCOUNTER — APPOINTMENT (OUTPATIENT)
Dept: GASTROENTEROLOGY | Facility: CLINIC | Age: 37
End: 2022-02-04
Payer: COMMERCIAL

## 2022-02-04 DIAGNOSIS — Z78.9 OTHER SPECIFIED HEALTH STATUS: ICD-10-CM

## 2022-02-04 DIAGNOSIS — K80.50 CALCULUS OF BILE DUCT W/OUT CHOLANGITIS OR CHOLECYSTITIS W/OUT OBSTRUCTION: ICD-10-CM

## 2022-02-04 PROCEDURE — 99442: CPT

## 2022-02-04 RX ORDER — LABETALOL HYDROCHLORIDE 200 MG/1
200 TABLET, FILM COATED ORAL
Refills: 0 | Status: ACTIVE | COMMUNITY

## 2022-02-04 NOTE — HISTORY OF PRESENT ILLNESS
[FreeTextEntry4] : Hien [de-identified] : Patient is a 37 y/o female with PMHx of HTN, Anemia , recent pregnancy who is s/p recent CCY done on admission 1/2022 who presented to [facility] again with pains. Patient had elevated LFT and ERCP was done with removal of stone and stent placement.\par \par

## 2022-02-04 NOTE — ASSESSMENT
[FreeTextEntry1] : Patient is a 37 y/o female with PMHx of HTN, Anemia , recent pregnancy who is s/p recent CCY done on admission 1/2022 who presented to [facility] again with pains. Patient had elevated LFT and ERCP was done with removal of stone and stent placement. Patient feels well. \par \par Prior ERCP for CBD stone\par - Plan ERCP to remove stents in 3-4 weeks

## 2022-02-05 DIAGNOSIS — K80.51 CALCULUS OF BILE DUCT WITHOUT CHOLANGITIS OR CHOLECYSTITIS WITH OBSTRUCTION: ICD-10-CM

## 2022-02-05 DIAGNOSIS — I10 ESSENTIAL (PRIMARY) HYPERTENSION: ICD-10-CM

## 2022-02-05 DIAGNOSIS — D64.9 ANEMIA, UNSPECIFIED: ICD-10-CM

## 2022-02-05 DIAGNOSIS — Z20.822 CONTACT WITH AND (SUSPECTED) EXPOSURE TO COVID-19: ICD-10-CM

## 2022-03-08 ENCOUNTER — LABORATORY RESULT (OUTPATIENT)
Age: 37
End: 2022-03-08

## 2022-03-11 ENCOUNTER — TRANSCRIPTION ENCOUNTER (OUTPATIENT)
Age: 37
End: 2022-03-11

## 2022-03-11 ENCOUNTER — OUTPATIENT (OUTPATIENT)
Dept: OUTPATIENT SERVICES | Facility: HOSPITAL | Age: 37
LOS: 1 days | Discharge: HOME | End: 2022-03-11
Payer: COMMERCIAL

## 2022-03-11 VITALS
HEART RATE: 64 BPM | DIASTOLIC BLOOD PRESSURE: 88 MMHG | OXYGEN SATURATION: 100 % | SYSTOLIC BLOOD PRESSURE: 162 MMHG | RESPIRATION RATE: 18 BRPM

## 2022-03-11 VITALS
DIASTOLIC BLOOD PRESSURE: 88 MMHG | SYSTOLIC BLOOD PRESSURE: 144 MMHG | HEART RATE: 65 BPM | HEIGHT: 62 IN | TEMPERATURE: 97 F | WEIGHT: 164.91 LBS | RESPIRATION RATE: 20 BRPM

## 2022-03-11 DIAGNOSIS — Z98.891 HISTORY OF UTERINE SCAR FROM PREVIOUS SURGERY: Chronic | ICD-10-CM

## 2022-03-11 DIAGNOSIS — Z90.49 ACQUIRED ABSENCE OF OTHER SPECIFIED PARTS OF DIGESTIVE TRACT: Chronic | ICD-10-CM

## 2022-03-11 PROCEDURE — 43264 ERCP REMOVE DUCT CALCULI: CPT | Mod: XU

## 2022-03-11 PROCEDURE — 74328 X-RAY BILE DUCT ENDOSCOPY: CPT | Mod: 26

## 2022-03-11 PROCEDURE — 43275 ERCP REMOVE FORGN BODY DUCT: CPT

## 2022-03-11 RX ORDER — LABETALOL HCL 100 MG
1 TABLET ORAL
Qty: 0 | Refills: 0 | DISCHARGE

## 2022-03-11 RX ORDER — FERROUS SULFATE 325(65) MG
0 TABLET ORAL
Qty: 0 | Refills: 0 | DISCHARGE

## 2022-03-11 RX ORDER — INDOMETHACIN 50 MG
100 CAPSULE ORAL ONCE
Refills: 0 | Status: COMPLETED | OUTPATIENT
Start: 2022-03-11 | End: 2022-03-11

## 2022-03-11 RX ADMIN — Medication 100 MILLIGRAM(S): at 13:57

## 2022-03-11 NOTE — CHART NOTE - NSCHARTNOTEFT_GEN_A_CORE
PACU ANESTHESIA ADMISSION NOTE      Procedure:  ERCP  Post op diagnosis:   CBD stone    ____  Intubated  TV:______       Rate: ______      FiO2: ______    __x__  Patent Airway    __x__  Full return of protective reflexes    __x__  Full recovery from anesthesia / back to baseline status      Mental Status:  __x__ Awake   ___x__ Alert   _____ Drowsy   _____ Sedated    Nausea/Vomiting:  __x__ NO  ______Yes,   See Post - Op Orders          Pain Scale (0-10):  ___0__    Treatment: ____ None    __x__ See Post - Op/PCA Orders    Post - Operative Fluids:   ____ Oral   __x__ See Post - Op Orders    Plan: Discharge:   __x__Home       _____Floor     _____Critical Care    _____  Other:_________________    Comments: Patient had smooth intraoperative event, no anesthesia complication.  PACU Vital signs: HR:   70         BP:    132    /       87   RR:      16       O2 Sat:   100    %     Temp 97.2

## 2022-03-11 NOTE — H&P PST ADULT - ASSESSMENT
35 y/o female with PMHx of HTN, Anemia , recent pregnancy who is s/p recent CCY done on admission 1/2022 after hx of choledocholithiasis w/ ERCP and stent placement, here for stent removal

## 2022-03-11 NOTE — ASU DISCHARGE PLAN (ADULT/PEDIATRIC) - CALL YOUR DOCTOR IF YOU HAVE ANY OF THE FOLLOWING:
Bleeding that does not stop/Pain not relieved by Medications/Fever greater than (need to indicate Fahrenheit or Celsius)/Nausea and vomiting that does not stop/Excessive diarrhea/Increased irritability or sluggishness

## 2022-03-11 NOTE — ASU DISCHARGE PLAN (ADULT/PEDIATRIC) - NS MD DC FALL RISK RISK
For information on Fall & Injury Prevention, visit: [email or web address] OR  [email or web address] OR  https://www.cdc.gov/steadi/patient.html

## 2022-03-18 DIAGNOSIS — K80.50 CALCULUS OF BILE DUCT WITHOUT CHOLANGITIS OR CHOLECYSTITIS WITHOUT OBSTRUCTION: ICD-10-CM

## 2022-03-18 DIAGNOSIS — I10 ESSENTIAL (PRIMARY) HYPERTENSION: ICD-10-CM

## 2022-03-18 DIAGNOSIS — D64.9 ANEMIA, UNSPECIFIED: ICD-10-CM

## 2022-04-27 ENCOUNTER — APPOINTMENT (OUTPATIENT)
Dept: GASTROENTEROLOGY | Facility: CLINIC | Age: 37
End: 2022-04-27

## 2022-12-14 NOTE — PROGRESS NOTE ADULT - SUBJECTIVE AND OBJECTIVE BOX
Pediatric Dermatology Clinic Note    Samara Reyes Valenzuela  MRN: [id number]  Visit Date: December 14, 2022    Assessment and Plan:  1. Molluscum Contagiosum: Discussed that this is a common viral infection seen in adults and children.  Most children clear their infection within 2-3 years time. Treatments are aimed at destroying lesions or stimulate an immune response to allow antibody production. A variety of treatment options were discussed today. An informational handout was provided. Gentle skin care was advised to prevent viral spreading    Family opted for treatment with imiquimod  Start imiquimod 5% cream every other night to the molluscum. Wash off after 8 hours. Discussed that we may experience irritation from this medication. Recommend the patient wash hands after use or use gloves to apply. Keep medication away from pets.  Discussed this may take 3-4 months to see improvement.       RTCprn    Thank you for involving me in this patient's care.   Luci Posada MD  Pediatric Dermatology Staff    ___________________________________________________    CC: Provider Not In System   CC: Patient presents with:  New Patient: molluscum        HPI:   Samara Reyes Valenzuela is a 4 year old 5 month old female presenting for initial evaluation of molluscum. The patient is seen a the request of [facility]. Lesions have been present for several months (6 months or more). Patient  Is accompanied by her father who provided the history. history was obtained with the help of a Burmese interpretor.    Past treatments: was given   Symptoms: bumps are itchy and sometimes she scratches them  Locations: chest and abdomen    Other Concerns: does not use emollients.   There is no problem list on file for this patient.      No Known Allergies      Current Outpatient Medications   Medication     acetaminophen (TYLENOL) 160 MG/5ML elixir     ibuprofen (ADVIL/MOTRIN) 100 MG/5ML suspension     No current  "facility-administered medications for this visit.       Pediatric History   Patient Parents     BRANDON MCKEON (Mother)     REYES, RAMON (Father)     Other Topics Concern     Not on file   Social History Narrative     Not on file       Family History: No other family members with skin infections.    Social history: sury has an older sibling and a younger brother. Sury is in 1st grade  ROS: Negative for fever, weight loss, change in appetite, bone pain/swelling, headaches, vision or hearing problems, cough, rhinorrhea, nausea, vomiting, diarrhea, or mood changes. significant for cough, upset stomach,  Ear pain and vomiting.    PHYSICAL EXAMINATION:     VITAL SIGNS:  Ht 3' 9.32\" (115.1 cm)   Wt 23 kg (50 lb 11.3 oz)   BMI 17.36 kg/m    GENERAL:  Well appearing and well nourished, in no acute distress.       SKIN:  Full body skin examination including inspection and palpation of the skin and subcutaneous tissues of the scalp, face, neck, chest, abdomen, back, bilateral upper and bilateral lower extremities  was completed today.  Exam was notable for:  --Smooth topped pink papules, 1-2 mm, some with central umbilication, located on the left chest and abdomen (approximately 12 in total)        " PGY2 Note    Patient seen at bedside after prolonged monitoring. Denies HA, blurry vision, CP, SOB, abdominal pain, or LE pain/swelling. Had severe BPs secondary to missing PM dose of labetalol. Increased dose to 200mg BID, dose given in triage.       HR: 82 (21 @ 23:31) (81 - 114)  BP: 149/89 (21 @ 23:46) (120/90 - 174/89)    EFM: 135/mod/+accels  TOCO: none  SVE: deferred    Medications:  labetalol: 100 milliGRAM(s) (21 @ 22:30)  labetalol: 100 milliGRAM(s) (21 @ 23:33)      Labs:                        10.7   12.02 )-----------( 186      ( 2021 18:15 )             31.9     02    136  |  101  |  7<L>  ----------------------------<  71  3.8   |  22  |  0.6<L>    Ca    8.9      2021 18:15    TPro  6.0  /  Alb  3.5  /  TBili  <0.2  /  DBili  x   /  AST  18  /  ALT  16  /  AlkPhos  92          Urinalysis Basic - ( 2021 21:21 )    Color: Yellow / Appearance: Slightly Turbid / S.027 / pH: x  Gluc: x / Ketone: Moderate  / Bili: Negative / Urobili: <2 mg/dL   Blood: x / Protein: 30 mg/dL / Nitrite: Negative   Leuk Esterase: Negative / RBC: 9 /HPF / WBC 10 /HPF   Sq Epi: x / Non Sq Epi: >27 /HPF / Bacteria: Negative          Uric Acid, Serum: 5.4 mg/dL (21 @ 18:15)    Lactate Dehydrogenase, Serum: 159 (21 @ 18:15)

## 2023-02-01 ENCOUNTER — NON-APPOINTMENT (OUTPATIENT)
Age: 38
End: 2023-02-01

## 2023-03-11 ENCOUNTER — NON-APPOINTMENT (OUTPATIENT)
Age: 38
End: 2023-03-11

## 2023-03-14 ENCOUNTER — NON-APPOINTMENT (OUTPATIENT)
Age: 38
End: 2023-03-14

## 2023-04-07 ENCOUNTER — APPOINTMENT (OUTPATIENT)
Dept: PULMONOLOGY | Facility: CLINIC | Age: 38
End: 2023-04-07
Payer: COMMERCIAL

## 2023-04-07 ENCOUNTER — TRANSCRIPTION ENCOUNTER (OUTPATIENT)
Age: 38
End: 2023-04-07

## 2023-04-07 VITALS
BODY MASS INDEX: 32.2 KG/M2 | DIASTOLIC BLOOD PRESSURE: 86 MMHG | HEART RATE: 69 BPM | OXYGEN SATURATION: 99 % | SYSTOLIC BLOOD PRESSURE: 124 MMHG | HEIGHT: 62 IN | WEIGHT: 175 LBS

## 2023-04-07 DIAGNOSIS — Z86.79 PERSONAL HISTORY OF OTHER DISEASES OF THE CIRCULATORY SYSTEM: ICD-10-CM

## 2023-04-07 DIAGNOSIS — K80.20 CALCULUS OF GALLBLADDER W/OUT CHOLECYSTITIS W/OUT OBSTRUCTION: ICD-10-CM

## 2023-04-07 PROCEDURE — 99203 OFFICE O/P NEW LOW 30 MIN: CPT

## 2023-04-07 NOTE — HISTORY OF PRESENT ILLNESS
[Initial Evaluation] : an initial evaluation of [Excessive Sleepiness-Day] : excessive daytime sleepiness [Snoring] : snoring [Unrefreshing Sleep] : unrefreshing sleep [Currently Experiencing] : The patient is currently experiencing symptoms. [Gradual] : gradual [Moderate] : moderate in severity [Sleeping on Back] : sleeping on back [Fatigue] : fatigue [Weight Gain] : weight gain [None] : The patient is not currently being treated for this problem [Hypertension] : hypertension [TextBox_4] : REPORTS ABDIEL  SYMPTOMS/ HTN SINCE AGE 14

## 2023-04-30 ENCOUNTER — OUTPATIENT (OUTPATIENT)
Dept: OUTPATIENT SERVICES | Facility: HOSPITAL | Age: 38
LOS: 1 days | End: 2023-04-30
Payer: COMMERCIAL

## 2023-04-30 DIAGNOSIS — Z00.8 ENCOUNTER FOR OTHER GENERAL EXAMINATION: ICD-10-CM

## 2023-04-30 DIAGNOSIS — N20.0 CALCULUS OF KIDNEY: ICD-10-CM

## 2023-04-30 DIAGNOSIS — Z90.49 ACQUIRED ABSENCE OF OTHER SPECIFIED PARTS OF DIGESTIVE TRACT: Chronic | ICD-10-CM

## 2023-04-30 DIAGNOSIS — R10.9 UNSPECIFIED ABDOMINAL PAIN: ICD-10-CM

## 2023-04-30 DIAGNOSIS — Z98.891 HISTORY OF UTERINE SCAR FROM PREVIOUS SURGERY: Chronic | ICD-10-CM

## 2023-04-30 PROCEDURE — 76700 US EXAM ABDOM COMPLETE: CPT

## 2023-04-30 PROCEDURE — 76857 US EXAM PELVIC LIMITED: CPT | Mod: 26

## 2023-04-30 PROCEDURE — 76857 US EXAM PELVIC LIMITED: CPT

## 2023-04-30 PROCEDURE — 76700 US EXAM ABDOM COMPLETE: CPT | Mod: 26

## 2023-05-01 DIAGNOSIS — R10.9 UNSPECIFIED ABDOMINAL PAIN: ICD-10-CM

## 2023-05-01 DIAGNOSIS — N20.0 CALCULUS OF KIDNEY: ICD-10-CM

## 2023-06-02 ENCOUNTER — APPOINTMENT (OUTPATIENT)
Dept: SLEEP CENTER | Facility: HOSPITAL | Age: 38
End: 2023-06-02
Payer: COMMERCIAL

## 2023-06-02 ENCOUNTER — OUTPATIENT (OUTPATIENT)
Dept: OUTPATIENT SERVICES | Facility: HOSPITAL | Age: 38
LOS: 1 days | Discharge: HOME | End: 2023-06-02
Payer: COMMERCIAL

## 2023-06-02 DIAGNOSIS — Z98.891 HISTORY OF UTERINE SCAR FROM PREVIOUS SURGERY: Chronic | ICD-10-CM

## 2023-06-02 DIAGNOSIS — G47.33 OBSTRUCTIVE SLEEP APNEA (ADULT) (PEDIATRIC): ICD-10-CM

## 2023-06-02 DIAGNOSIS — Z90.49 ACQUIRED ABSENCE OF OTHER SPECIFIED PARTS OF DIGESTIVE TRACT: Chronic | ICD-10-CM

## 2023-06-02 PROCEDURE — 95810 POLYSOM 6/> YRS 4/> PARAM: CPT | Mod: 26

## 2023-06-02 PROCEDURE — 95810 POLYSOM 6/> YRS 4/> PARAM: CPT

## 2023-06-06 DIAGNOSIS — G47.33 OBSTRUCTIVE SLEEP APNEA (ADULT) (PEDIATRIC): ICD-10-CM

## 2023-06-23 ENCOUNTER — NON-APPOINTMENT (OUTPATIENT)
Age: 38
End: 2023-06-23

## 2023-07-13 ENCOUNTER — APPOINTMENT (OUTPATIENT)
Dept: PULMONOLOGY | Facility: CLINIC | Age: 38
End: 2023-07-13
Payer: COMMERCIAL

## 2023-07-13 DIAGNOSIS — G47.33 OBSTRUCTIVE SLEEP APNEA (ADULT) (PEDIATRIC): ICD-10-CM

## 2023-07-13 PROCEDURE — 99212 OFFICE O/P EST SF 10 MIN: CPT | Mod: 95

## 2024-03-01 ENCOUNTER — OUTPATIENT (OUTPATIENT)
Dept: OUTPATIENT SERVICES | Facility: HOSPITAL | Age: 39
LOS: 1 days | End: 2024-03-01
Payer: COMMERCIAL

## 2024-03-01 DIAGNOSIS — Z12.31 ENCOUNTER FOR SCREENING MAMMOGRAM FOR MALIGNANT NEOPLASM OF BREAST: ICD-10-CM

## 2024-03-01 DIAGNOSIS — Z98.891 HISTORY OF UTERINE SCAR FROM PREVIOUS SURGERY: Chronic | ICD-10-CM

## 2024-03-01 DIAGNOSIS — Z90.49 ACQUIRED ABSENCE OF OTHER SPECIFIED PARTS OF DIGESTIVE TRACT: Chronic | ICD-10-CM

## 2024-03-01 PROCEDURE — 77067 SCR MAMMO BI INCL CAD: CPT

## 2024-03-01 PROCEDURE — 77063 BREAST TOMOSYNTHESIS BI: CPT | Mod: 26

## 2024-03-01 PROCEDURE — 77067 SCR MAMMO BI INCL CAD: CPT | Mod: 26

## 2024-03-01 PROCEDURE — 77063 BREAST TOMOSYNTHESIS BI: CPT

## 2024-03-02 DIAGNOSIS — Z12.31 ENCOUNTER FOR SCREENING MAMMOGRAM FOR MALIGNANT NEOPLASM OF BREAST: ICD-10-CM

## 2024-03-28 NOTE — ED ADULT NURSE NOTE - OBJECTIVE STATEMENT
1 pt presents to ED c/o epigastic pain, n/v for last few hours, states this has been happening intermittently in flare ups since [place] but usually occurs after large fatty meals.

## 2024-06-07 ENCOUNTER — OUTPATIENT (OUTPATIENT)
Dept: OUTPATIENT SERVICES | Facility: HOSPITAL | Age: 39
LOS: 1 days | End: 2024-06-07
Payer: COMMERCIAL

## 2024-06-07 DIAGNOSIS — R10.9 UNSPECIFIED ABDOMINAL PAIN: ICD-10-CM

## 2024-06-07 DIAGNOSIS — Z00.8 ENCOUNTER FOR OTHER GENERAL EXAMINATION: ICD-10-CM

## 2024-06-07 DIAGNOSIS — Z98.891 HISTORY OF UTERINE SCAR FROM PREVIOUS SURGERY: Chronic | ICD-10-CM

## 2024-06-07 DIAGNOSIS — Z90.49 ACQUIRED ABSENCE OF OTHER SPECIFIED PARTS OF DIGESTIVE TRACT: Chronic | ICD-10-CM

## 2024-06-07 PROCEDURE — 76700 US EXAM ABDOM COMPLETE: CPT | Mod: 26

## 2024-06-07 PROCEDURE — 76700 US EXAM ABDOM COMPLETE: CPT

## 2024-06-08 DIAGNOSIS — R10.9 UNSPECIFIED ABDOMINAL PAIN: ICD-10-CM

## 2025-05-31 ENCOUNTER — OUTPATIENT (OUTPATIENT)
Dept: OUTPATIENT SERVICES | Facility: HOSPITAL | Age: 40
LOS: 1 days | End: 2025-05-31
Payer: COMMERCIAL

## 2025-05-31 DIAGNOSIS — Z12.31 ENCOUNTER FOR SCREENING MAMMOGRAM FOR MALIGNANT NEOPLASM OF BREAST: ICD-10-CM

## 2025-05-31 DIAGNOSIS — R92.2 INCONCLUSIVE MAMMOGRAM: ICD-10-CM

## 2025-05-31 DIAGNOSIS — Z98.891 HISTORY OF UTERINE SCAR FROM PREVIOUS SURGERY: Chronic | ICD-10-CM

## 2025-05-31 DIAGNOSIS — Z00.00 ENCOUNTER FOR GENERAL ADULT MEDICAL EXAMINATION WITHOUT ABNORMAL FINDINGS: ICD-10-CM

## 2025-05-31 DIAGNOSIS — Z90.49 ACQUIRED ABSENCE OF OTHER SPECIFIED PARTS OF DIGESTIVE TRACT: Chronic | ICD-10-CM

## 2025-05-31 PROCEDURE — 77063 BREAST TOMOSYNTHESIS BI: CPT

## 2025-05-31 PROCEDURE — 76641 ULTRASOUND BREAST COMPLETE: CPT | Mod: 26,50

## 2025-05-31 PROCEDURE — 77063 BREAST TOMOSYNTHESIS BI: CPT | Mod: 26

## 2025-05-31 PROCEDURE — 77067 SCR MAMMO BI INCL CAD: CPT

## 2025-05-31 PROCEDURE — 77067 SCR MAMMO BI INCL CAD: CPT | Mod: 26

## 2025-05-31 PROCEDURE — 76641 ULTRASOUND BREAST COMPLETE: CPT | Mod: 50

## 2025-06-01 DIAGNOSIS — Z12.31 ENCOUNTER FOR SCREENING MAMMOGRAM FOR MALIGNANT NEOPLASM OF BREAST: ICD-10-CM

## 2025-06-01 DIAGNOSIS — R92.2 INCONCLUSIVE MAMMOGRAM: ICD-10-CM

## 2025-06-13 ENCOUNTER — OUTPATIENT (OUTPATIENT)
Dept: OUTPATIENT SERVICES | Facility: HOSPITAL | Age: 40
LOS: 1 days | End: 2025-06-13
Payer: COMMERCIAL

## 2025-06-13 DIAGNOSIS — Z98.891 HISTORY OF UTERINE SCAR FROM PREVIOUS SURGERY: Chronic | ICD-10-CM

## 2025-06-13 DIAGNOSIS — R92.8 OTHER ABNORMAL AND INCONCLUSIVE FINDINGS ON DIAGNOSTIC IMAGING OF BREAST: ICD-10-CM

## 2025-06-13 DIAGNOSIS — Z90.49 ACQUIRED ABSENCE OF OTHER SPECIFIED PARTS OF DIGESTIVE TRACT: Chronic | ICD-10-CM

## 2025-06-13 PROCEDURE — 77061 BREAST TOMOSYNTHESIS UNI: CPT | Mod: 26,LT

## 2025-06-13 PROCEDURE — 77065 DX MAMMO INCL CAD UNI: CPT | Mod: LT

## 2025-06-13 PROCEDURE — G0279: CPT

## 2025-06-13 PROCEDURE — 76642 ULTRASOUND BREAST LIMITED: CPT | Mod: 26,50

## 2025-06-13 PROCEDURE — 77065 DX MAMMO INCL CAD UNI: CPT | Mod: 26,LT

## 2025-06-13 PROCEDURE — 76642 ULTRASOUND BREAST LIMITED: CPT | Mod: 50

## 2025-06-14 DIAGNOSIS — R92.8 OTHER ABNORMAL AND INCONCLUSIVE FINDINGS ON DIAGNOSTIC IMAGING OF BREAST: ICD-10-CM
